# Patient Record
Sex: MALE | Race: WHITE | Employment: PART TIME | URBAN - METROPOLITAN AREA
[De-identification: names, ages, dates, MRNs, and addresses within clinical notes are randomized per-mention and may not be internally consistent; named-entity substitution may affect disease eponyms.]

---

## 2017-01-01 ENCOUNTER — APPOINTMENT (OUTPATIENT)
Dept: HEMATOLOGY/ONCOLOGY | Facility: HOSPITAL | Age: 67
End: 2017-01-01
Attending: INTERNAL MEDICINE
Payer: MEDICARE

## 2017-01-01 ENCOUNTER — TELEPHONE (OUTPATIENT)
Dept: OTOLARYNGOLOGY | Facility: CLINIC | Age: 67
End: 2017-01-01

## 2017-01-01 ENCOUNTER — OFFICE VISIT (OUTPATIENT)
Dept: RADIATION ONCOLOGY | Facility: HOSPITAL | Age: 67
End: 2017-01-01
Attending: RADIOLOGY
Payer: MEDICARE

## 2017-01-01 ENCOUNTER — PRIOR ORIGINAL RECORDS (OUTPATIENT)
Dept: OTHER | Age: 67
End: 2017-01-01

## 2017-01-01 ENCOUNTER — DIETICIAN VISIT (OUTPATIENT)
Dept: NUTRITION | Facility: HOSPITAL | Age: 67
End: 2017-01-01

## 2017-01-01 ENCOUNTER — TELEPHONE (OUTPATIENT)
Dept: INTERNAL MEDICINE CLINIC | Facility: CLINIC | Age: 67
End: 2017-01-01

## 2017-01-01 ENCOUNTER — OFFICE VISIT (OUTPATIENT)
Dept: GASTROENTEROLOGY | Facility: CLINIC | Age: 67
End: 2017-01-01

## 2017-01-01 ENCOUNTER — HOSPITAL ENCOUNTER (OUTPATIENT)
Dept: GENERAL RADIOLOGY | Age: 67
Discharge: HOME | End: 2017-01-01
Attending: RADIOLOGY
Payer: MEDICARE

## 2017-01-01 ENCOUNTER — OFFICE VISIT (OUTPATIENT)
Dept: HEMATOLOGY/ONCOLOGY | Facility: HOSPITAL | Age: 67
End: 2017-01-01
Attending: PHYSICIAN ASSISTANT
Payer: MEDICARE

## 2017-01-01 ENCOUNTER — APPOINTMENT (OUTPATIENT)
Dept: CT IMAGING | Facility: HOSPITAL | Age: 67
End: 2017-01-01
Payer: MEDICARE

## 2017-01-01 ENCOUNTER — TELEPHONE (OUTPATIENT)
Dept: GASTROENTEROLOGY | Facility: CLINIC | Age: 67
End: 2017-01-01

## 2017-01-01 ENCOUNTER — OFFICE VISIT (OUTPATIENT)
Dept: INTERNAL MEDICINE CLINIC | Facility: CLINIC | Age: 67
End: 2017-01-01

## 2017-01-01 ENCOUNTER — HOSPITAL ENCOUNTER (OUTPATIENT)
Facility: HOSPITAL | Age: 67
Setting detail: OBSERVATION
Discharge: HOME | End: 2017-01-01
Attending: INTERNAL MEDICINE | Admitting: INTERNAL MEDICINE
Payer: MEDICARE

## 2017-01-01 ENCOUNTER — HOSPITAL ENCOUNTER (OUTPATIENT)
Dept: ULTRASOUND IMAGING | Age: 67
Discharge: HOME | End: 2017-01-01
Attending: INTERNAL MEDICINE
Payer: MEDICARE

## 2017-01-01 ENCOUNTER — HOSPITAL ENCOUNTER (EMERGENCY)
Facility: HOSPITAL | Age: 67
Discharge: ANOTHER HOSPITAL | End: 2017-01-01
Attending: EMERGENCY MEDICINE
Payer: MEDICARE

## 2017-01-01 ENCOUNTER — TELEPHONE (OUTPATIENT)
Dept: HEMATOLOGY/ONCOLOGY | Facility: HOSPITAL | Age: 67
End: 2017-01-01

## 2017-01-01 ENCOUNTER — ANESTHESIA EVENT (OUTPATIENT)
Dept: ENDOSCOPY | Facility: HOSPITAL | Age: 67
End: 2017-01-01
Payer: MEDICARE

## 2017-01-01 ENCOUNTER — TELEPHONE (OUTPATIENT)
Dept: RADIATION ONCOLOGY | Facility: HOSPITAL | Age: 67
End: 2017-01-01

## 2017-01-01 ENCOUNTER — LAB REQUISITION (OUTPATIENT)
Dept: LAB | Facility: HOSPITAL | Age: 67
End: 2017-01-01
Payer: MEDICARE

## 2017-01-01 ENCOUNTER — SURGERY (OUTPATIENT)
Age: 67
End: 2017-01-01

## 2017-01-01 ENCOUNTER — DOCUMENTATION ONLY (OUTPATIENT)
Dept: RADIATION ONCOLOGY | Facility: HOSPITAL | Age: 67
End: 2017-01-01

## 2017-01-01 ENCOUNTER — HOSPITAL ENCOUNTER (OUTPATIENT)
Dept: NUCLEAR MEDICINE | Facility: HOSPITAL | Age: 67
Discharge: HOME | End: 2017-01-01
Attending: OTOLARYNGOLOGY
Payer: MEDICARE

## 2017-01-01 ENCOUNTER — LAB ENCOUNTER (OUTPATIENT)
Dept: LAB | Age: 67
End: 2017-01-01
Attending: INTERNAL MEDICINE
Payer: MEDICARE

## 2017-01-01 ENCOUNTER — OFFICE VISIT (OUTPATIENT)
Dept: OTOLARYNGOLOGY | Facility: CLINIC | Age: 67
End: 2017-01-01

## 2017-01-01 ENCOUNTER — ANESTHESIA (OUTPATIENT)
Dept: ENDOSCOPY | Facility: HOSPITAL | Age: 67
End: 2017-01-01
Payer: MEDICARE

## 2017-01-01 ENCOUNTER — APPOINTMENT (OUTPATIENT)
Dept: GENERAL RADIOLOGY | Facility: HOSPITAL | Age: 67
End: 2017-01-01
Attending: EMERGENCY MEDICINE
Payer: MEDICARE

## 2017-01-01 ENCOUNTER — HOSPITAL ENCOUNTER (OUTPATIENT)
Dept: GENERAL RADIOLOGY | Age: 67
Discharge: HOME | End: 2017-01-01
Attending: INTERNAL MEDICINE
Payer: MEDICARE

## 2017-01-01 VITALS
WEIGHT: 224 LBS | TEMPERATURE: 98 F | DIASTOLIC BLOOD PRESSURE: 78 MMHG | HEIGHT: 74 IN | SYSTOLIC BLOOD PRESSURE: 136 MMHG | BODY MASS INDEX: 28.75 KG/M2 | RESPIRATION RATE: 18 BRPM | HEART RATE: 110 BPM

## 2017-01-01 VITALS
HEART RATE: 97 BPM | RESPIRATION RATE: 16 BRPM | RESPIRATION RATE: 16 BRPM | DIASTOLIC BLOOD PRESSURE: 79 MMHG | DIASTOLIC BLOOD PRESSURE: 73 MMHG | TEMPERATURE: 98 F | SYSTOLIC BLOOD PRESSURE: 158 MMHG | HEART RATE: 87 BPM | TEMPERATURE: 98 F | SYSTOLIC BLOOD PRESSURE: 139 MMHG

## 2017-01-01 VITALS
SYSTOLIC BLOOD PRESSURE: 134 MMHG | RESPIRATION RATE: 16 BRPM | TEMPERATURE: 99 F | HEART RATE: 100 BPM | DIASTOLIC BLOOD PRESSURE: 68 MMHG | WEIGHT: 222 LBS | HEIGHT: 74 IN | BODY MASS INDEX: 28.49 KG/M2

## 2017-01-01 VITALS
BODY MASS INDEX: 30.85 KG/M2 | WEIGHT: 240.38 LBS | RESPIRATION RATE: 16 BRPM | HEART RATE: 78 BPM | SYSTOLIC BLOOD PRESSURE: 117 MMHG | DIASTOLIC BLOOD PRESSURE: 67 MMHG | HEIGHT: 74 IN

## 2017-01-01 VITALS
DIASTOLIC BLOOD PRESSURE: 69 MMHG | TEMPERATURE: 99 F | HEART RATE: 86 BPM | HEIGHT: 74 IN | OXYGEN SATURATION: 95 % | RESPIRATION RATE: 16 BRPM | WEIGHT: 220.19 LBS | SYSTOLIC BLOOD PRESSURE: 147 MMHG | BODY MASS INDEX: 28.26 KG/M2

## 2017-01-01 VITALS
SYSTOLIC BLOOD PRESSURE: 137 MMHG | HEIGHT: 74 IN | DIASTOLIC BLOOD PRESSURE: 69 MMHG | TEMPERATURE: 98 F | RESPIRATION RATE: 18 BRPM | BODY MASS INDEX: 28.23 KG/M2 | HEART RATE: 93 BPM | WEIGHT: 220 LBS

## 2017-01-01 VITALS
HEART RATE: 74 BPM | RESPIRATION RATE: 16 BRPM | SYSTOLIC BLOOD PRESSURE: 131 MMHG | TEMPERATURE: 99 F | DIASTOLIC BLOOD PRESSURE: 76 MMHG

## 2017-01-01 VITALS
DIASTOLIC BLOOD PRESSURE: 68 MMHG | WEIGHT: 242.19 LBS | HEIGHT: 74 IN | SYSTOLIC BLOOD PRESSURE: 147 MMHG | HEART RATE: 89 BPM | RESPIRATION RATE: 18 BRPM | BODY MASS INDEX: 31.08 KG/M2

## 2017-01-01 VITALS
SYSTOLIC BLOOD PRESSURE: 121 MMHG | HEART RATE: 83 BPM | RESPIRATION RATE: 16 BRPM | TEMPERATURE: 98 F | DIASTOLIC BLOOD PRESSURE: 70 MMHG

## 2017-01-01 VITALS
HEART RATE: 78 BPM | WEIGHT: 241 LBS | RESPIRATION RATE: 16 BRPM | BODY MASS INDEX: 31 KG/M2 | SYSTOLIC BLOOD PRESSURE: 138 MMHG | TEMPERATURE: 98 F | DIASTOLIC BLOOD PRESSURE: 71 MMHG

## 2017-01-01 VITALS
HEART RATE: 85 BPM | RESPIRATION RATE: 16 BRPM | SYSTOLIC BLOOD PRESSURE: 134 MMHG | HEIGHT: 74 IN | TEMPERATURE: 98 F | DIASTOLIC BLOOD PRESSURE: 81 MMHG | WEIGHT: 217 LBS | BODY MASS INDEX: 27.85 KG/M2

## 2017-01-01 VITALS
WEIGHT: 211 LBS | TEMPERATURE: 99 F | HEIGHT: 74 IN | HEART RATE: 100 BPM | SYSTOLIC BLOOD PRESSURE: 121 MMHG | RESPIRATION RATE: 18 BRPM | DIASTOLIC BLOOD PRESSURE: 69 MMHG | BODY MASS INDEX: 27.08 KG/M2

## 2017-01-01 VITALS
HEART RATE: 86 BPM | SYSTOLIC BLOOD PRESSURE: 131 MMHG | RESPIRATION RATE: 16 BRPM | TEMPERATURE: 99 F | DIASTOLIC BLOOD PRESSURE: 71 MMHG

## 2017-01-01 VITALS
TEMPERATURE: 98 F | HEIGHT: 74 IN | DIASTOLIC BLOOD PRESSURE: 76 MMHG | BODY MASS INDEX: 30.16 KG/M2 | WEIGHT: 235 LBS | SYSTOLIC BLOOD PRESSURE: 124 MMHG | RESPIRATION RATE: 16 BRPM | HEART RATE: 82 BPM

## 2017-01-01 VITALS
DIASTOLIC BLOOD PRESSURE: 73 MMHG | RESPIRATION RATE: 16 BRPM | WEIGHT: 237.63 LBS | TEMPERATURE: 98 F | TEMPERATURE: 99 F | BODY MASS INDEX: 30.5 KG/M2 | RESPIRATION RATE: 16 BRPM | SYSTOLIC BLOOD PRESSURE: 110 MMHG | HEART RATE: 92 BPM | SYSTOLIC BLOOD PRESSURE: 135 MMHG | DIASTOLIC BLOOD PRESSURE: 73 MMHG | HEART RATE: 88 BPM | HEIGHT: 74 IN

## 2017-01-01 VITALS
BODY MASS INDEX: 30.16 KG/M2 | HEIGHT: 74 IN | SYSTOLIC BLOOD PRESSURE: 99 MMHG | RESPIRATION RATE: 20 BRPM | WEIGHT: 235 LBS | DIASTOLIC BLOOD PRESSURE: 66 MMHG | HEART RATE: 80 BPM | TEMPERATURE: 99 F

## 2017-01-01 VITALS
WEIGHT: 211 LBS | BODY MASS INDEX: 27.08 KG/M2 | DIASTOLIC BLOOD PRESSURE: 69 MMHG | HEART RATE: 100 BPM | HEIGHT: 74 IN | RESPIRATION RATE: 18 BRPM | TEMPERATURE: 99 F | SYSTOLIC BLOOD PRESSURE: 121 MMHG

## 2017-01-01 VITALS
HEIGHT: 74 IN | RESPIRATION RATE: 16 BRPM | TEMPERATURE: 99 F | BODY MASS INDEX: 28.49 KG/M2 | DIASTOLIC BLOOD PRESSURE: 68 MMHG | SYSTOLIC BLOOD PRESSURE: 134 MMHG | WEIGHT: 222 LBS | HEART RATE: 100 BPM

## 2017-01-01 VITALS
TEMPERATURE: 98 F | BODY MASS INDEX: 30.8 KG/M2 | SYSTOLIC BLOOD PRESSURE: 155 MMHG | RESPIRATION RATE: 18 BRPM | WEIGHT: 240 LBS | HEIGHT: 74 IN | HEART RATE: 79 BPM | DIASTOLIC BLOOD PRESSURE: 74 MMHG

## 2017-01-01 VITALS
DIASTOLIC BLOOD PRESSURE: 68 MMHG | BODY MASS INDEX: 29 KG/M2 | BODY MASS INDEX: 27.87 KG/M2 | SYSTOLIC BLOOD PRESSURE: 118 MMHG | WEIGHT: 227.81 LBS | HEART RATE: 108 BPM | HEART RATE: 95 BPM | RESPIRATION RATE: 16 BRPM | OXYGEN SATURATION: 97 % | DIASTOLIC BLOOD PRESSURE: 77 MMHG | TEMPERATURE: 98 F | WEIGHT: 217.19 LBS | TEMPERATURE: 98 F | SYSTOLIC BLOOD PRESSURE: 124 MMHG | HEIGHT: 74 IN | RESPIRATION RATE: 16 BRPM

## 2017-01-01 VITALS
WEIGHT: 241 LBS | DIASTOLIC BLOOD PRESSURE: 88 MMHG | SYSTOLIC BLOOD PRESSURE: 140 MMHG | HEART RATE: 72 BPM | BODY MASS INDEX: 30.93 KG/M2 | RESPIRATION RATE: 20 BRPM | TEMPERATURE: 98 F | HEIGHT: 74 IN

## 2017-01-01 VITALS
DIASTOLIC BLOOD PRESSURE: 81 MMHG | SYSTOLIC BLOOD PRESSURE: 129 MMHG | WEIGHT: 214 LBS | TEMPERATURE: 99 F | HEART RATE: 92 BPM | RESPIRATION RATE: 16 BRPM | BODY MASS INDEX: 27 KG/M2

## 2017-01-01 VITALS
BODY MASS INDEX: 30.44 KG/M2 | RESPIRATION RATE: 16 BRPM | WEIGHT: 237.19 LBS | HEART RATE: 77 BPM | HEIGHT: 74 IN | DIASTOLIC BLOOD PRESSURE: 70 MMHG | SYSTOLIC BLOOD PRESSURE: 126 MMHG

## 2017-01-01 VITALS
RESPIRATION RATE: 16 BRPM | DIASTOLIC BLOOD PRESSURE: 71 MMHG | HEART RATE: 71 BPM | SYSTOLIC BLOOD PRESSURE: 144 MMHG | TEMPERATURE: 98 F

## 2017-01-01 VITALS
RESPIRATION RATE: 16 BRPM | HEART RATE: 92 BPM | TEMPERATURE: 98 F | DIASTOLIC BLOOD PRESSURE: 69 MMHG | SYSTOLIC BLOOD PRESSURE: 132 MMHG

## 2017-01-01 VITALS
SYSTOLIC BLOOD PRESSURE: 123 MMHG | HEART RATE: 84 BPM | RESPIRATION RATE: 16 BRPM | DIASTOLIC BLOOD PRESSURE: 59 MMHG | TEMPERATURE: 98 F

## 2017-01-01 VITALS
DIASTOLIC BLOOD PRESSURE: 70 MMHG | HEART RATE: 91 BPM | SYSTOLIC BLOOD PRESSURE: 106 MMHG | HEIGHT: 74 IN | WEIGHT: 227 LBS | BODY MASS INDEX: 29.13 KG/M2

## 2017-01-01 VITALS
TEMPERATURE: 99 F | RESPIRATION RATE: 16 BRPM | HEART RATE: 91 BPM | DIASTOLIC BLOOD PRESSURE: 78 MMHG | SYSTOLIC BLOOD PRESSURE: 147 MMHG

## 2017-01-01 VITALS — DIASTOLIC BLOOD PRESSURE: 77 MMHG | SYSTOLIC BLOOD PRESSURE: 126 MMHG | HEART RATE: 90 BPM

## 2017-01-01 VITALS
DIASTOLIC BLOOD PRESSURE: 78 MMHG | TEMPERATURE: 98 F | BODY MASS INDEX: 30.48 KG/M2 | SYSTOLIC BLOOD PRESSURE: 120 MMHG | HEIGHT: 74.5 IN | WEIGHT: 240 LBS

## 2017-01-01 VITALS
DIASTOLIC BLOOD PRESSURE: 64 MMHG | SYSTOLIC BLOOD PRESSURE: 121 MMHG | TEMPERATURE: 98 F | RESPIRATION RATE: 16 BRPM | HEART RATE: 80 BPM

## 2017-01-01 VITALS
TEMPERATURE: 99 F | RESPIRATION RATE: 16 BRPM | DIASTOLIC BLOOD PRESSURE: 70 MMHG | HEART RATE: 80 BPM | SYSTOLIC BLOOD PRESSURE: 136 MMHG

## 2017-01-01 VITALS
HEART RATE: 116 BPM | RESPIRATION RATE: 20 BRPM | TEMPERATURE: 99 F | WEIGHT: 198.44 LBS | OXYGEN SATURATION: 100 % | SYSTOLIC BLOOD PRESSURE: 116 MMHG | DIASTOLIC BLOOD PRESSURE: 66 MMHG

## 2017-01-01 VITALS — WEIGHT: 223 LBS | BODY MASS INDEX: 29 KG/M2

## 2017-01-01 DIAGNOSIS — C01 CANCER OF BASE OF TONGUE (HCC): Primary | ICD-10-CM

## 2017-01-01 DIAGNOSIS — D70.1 CHEMOTHERAPY INDUCED NEUTROPENIA (HCC): ICD-10-CM

## 2017-01-01 DIAGNOSIS — I25.10 CORONARY ARTERY DISEASE INVOLVING NATIVE HEART WITHOUT ANGINA PECTORIS, UNSPECIFIED VESSEL OR LESION TYPE: ICD-10-CM

## 2017-01-01 DIAGNOSIS — C10.9 OROPHARYNGEAL CANCER (HCC): ICD-10-CM

## 2017-01-01 DIAGNOSIS — Z09 CHEMOTHERAPY FOLLOW-UP EXAMINATION: ICD-10-CM

## 2017-01-01 DIAGNOSIS — R22.1 MASS OF RIGHT SIDE OF NECK: Primary | ICD-10-CM

## 2017-01-01 DIAGNOSIS — J34.89 LESION OF NOSE: ICD-10-CM

## 2017-01-01 DIAGNOSIS — E86.0 DEHYDRATION: Primary | ICD-10-CM

## 2017-01-01 DIAGNOSIS — C10.9 OROPHARYNGEAL CANCER (HCC): Primary | ICD-10-CM

## 2017-01-01 DIAGNOSIS — C01 MALIGNANT NEOPLASM OF BASE OF TONGUE (HCC): Primary | ICD-10-CM

## 2017-01-01 DIAGNOSIS — C76.0 HEAD AND NECK CANCER (HCC): ICD-10-CM

## 2017-01-01 DIAGNOSIS — D64.81 ANTINEOPLASTIC CHEMOTHERAPY INDUCED ANEMIA: ICD-10-CM

## 2017-01-01 DIAGNOSIS — M54.32 SCIATICA OF LEFT SIDE: ICD-10-CM

## 2017-01-01 DIAGNOSIS — C14.0 MALIGNANT NEOPLASM OF PHARYNX, UNSPECIFIED (HCC): Primary | ICD-10-CM

## 2017-01-01 DIAGNOSIS — I60.9 SUBARACHNOID HEMORRHAGE (HCC): Primary | ICD-10-CM

## 2017-01-01 DIAGNOSIS — E04.1 THYROID NODULE: ICD-10-CM

## 2017-01-01 DIAGNOSIS — R22.1 MASS OF RIGHT SIDE OF NECK: ICD-10-CM

## 2017-01-01 DIAGNOSIS — T45.1X5A ANTINEOPLASTIC CHEMOTHERAPY INDUCED ANEMIA: ICD-10-CM

## 2017-01-01 DIAGNOSIS — T45.1X5A CHEMOTHERAPY INDUCED NAUSEA AND VOMITING: ICD-10-CM

## 2017-01-01 DIAGNOSIS — S02.19XA CLOSED FRACTURE OF TEMPORAL BONE, INITIAL ENCOUNTER (HCC): ICD-10-CM

## 2017-01-01 DIAGNOSIS — R13.10 DYSPHAGIA, UNSPECIFIED TYPE: Primary | ICD-10-CM

## 2017-01-01 DIAGNOSIS — C01 MALIGNANT NEOPLASM OF BASE OF TONGUE (HCC): ICD-10-CM

## 2017-01-01 DIAGNOSIS — C01: Primary | ICD-10-CM

## 2017-01-01 DIAGNOSIS — C01 CANCER OF BASE OF TONGUE (HCC): ICD-10-CM

## 2017-01-01 DIAGNOSIS — C02.9 TONGUE CARCINOMA (HCC): Primary | ICD-10-CM

## 2017-01-01 DIAGNOSIS — R73.9 ELEVATED BLOOD SUGAR: Primary | ICD-10-CM

## 2017-01-01 DIAGNOSIS — T45.1X5A CHEMOTHERAPY INDUCED NEUTROPENIA (HCC): ICD-10-CM

## 2017-01-01 DIAGNOSIS — Z12.5 SCREENING PSA (PROSTATE SPECIFIC ANTIGEN): ICD-10-CM

## 2017-01-01 DIAGNOSIS — Z00.00 MEDICARE ANNUAL WELLNESS VISIT, SUBSEQUENT: Primary | ICD-10-CM

## 2017-01-01 DIAGNOSIS — E78.00 PURE HYPERCHOLESTEROLEMIA: ICD-10-CM

## 2017-01-01 DIAGNOSIS — C76.0 HEAD AND NECK CANCER (HCC): Primary | ICD-10-CM

## 2017-01-01 DIAGNOSIS — I10 HTN (HYPERTENSION), BENIGN: ICD-10-CM

## 2017-01-01 DIAGNOSIS — J30.9 ALLERGIC RHINITIS, UNSPECIFIED ALLERGIC RHINITIS TRIGGER, UNSPECIFIED RHINITIS SEASONALITY: ICD-10-CM

## 2017-01-01 DIAGNOSIS — R73.9 ELEVATED BLOOD SUGAR: ICD-10-CM

## 2017-01-01 DIAGNOSIS — L98.9 EXTERNAL NASAL LESION: ICD-10-CM

## 2017-01-01 DIAGNOSIS — R11.2 CHEMOTHERAPY INDUCED NAUSEA AND VOMITING: ICD-10-CM

## 2017-01-01 DIAGNOSIS — Z01.89 ENCOUNTER FOR OTHER SPECIFIED SPECIAL EXAMINATIONS: ICD-10-CM

## 2017-01-01 DIAGNOSIS — S27.0XXA TRAUMATIC PNEUMOTHORAX, INITIAL ENCOUNTER: ICD-10-CM

## 2017-01-01 DIAGNOSIS — R13.10 DYSPHAGIA: Primary | ICD-10-CM

## 2017-01-01 DIAGNOSIS — S22.5XXA CLOSED FRACTURE OF MULTIPLE RIBS WITH FLAIL CHEST, INITIAL ENCOUNTER: ICD-10-CM

## 2017-01-01 LAB
ALBUMIN SERPL BCP-MCNC: 3.5 G/DL (ref 3.5–4.8)
ALBUMIN SERPL BCP-MCNC: 3.6 G/DL (ref 3.5–4.8)
ALBUMIN/GLOB SERPL: 0.9 {RATIO} (ref 1–2)
ALBUMIN/GLOB SERPL: 1.1 {RATIO} (ref 1–2)
ALBUMIN: 4.4 G/DL
ALKALINE PHOSPHATATE(ALK PHOS): 58 IU/L
ALP SERPL-CCNC: 101 U/L (ref 32–100)
ALP SERPL-CCNC: 77 U/L (ref 32–100)
ALT (SGPT): 37 U/L
ALT SERPL-CCNC: 27 U/L (ref 17–63)
ALT SERPL-CCNC: 38 U/L (ref 17–63)
ANION GAP SERPL CALC-SCNC: 10 MMOL/L (ref 0–18)
ANION GAP SERPL CALC-SCNC: 11 MMOL/L (ref 0–18)
ANION GAP SERPL CALC-SCNC: 12 MMOL/L (ref 0–18)
ANION GAP SERPL CALC-SCNC: 14 MMOL/L (ref 0–18)
ANTIBODY SCREEN: NEGATIVE
APTT PPP: 32.1 SECONDS (ref 23.2–35.3)
AST (SGOT): 29 U/L
AST SERPL-CCNC: 25 U/L (ref 15–41)
AST SERPL-CCNC: 28 U/L (ref 15–41)
BACTERIA UR QL AUTO: NEGATIVE /HPF
BASOPHILS # BLD: 0 K/UL (ref 0–0.2)
BASOPHILS NFR BLD: 0 %
BASOPHILS NFR BLD: 1 %
BILIRUB SERPL-MCNC: 0.5 MG/DL (ref 0.3–1.2)
BILIRUB SERPL-MCNC: 0.6 MG/DL (ref 0.3–1.2)
BILIRUB UR QL: NEGATIVE
BILIRUBIN TOTAL: 1.1 MG/DL
BUN SERPL-MCNC: 11 MG/DL (ref 8–20)
BUN SERPL-MCNC: 12 MG/DL (ref 8–20)
BUN SERPL-MCNC: 13 MG/DL (ref 8–20)
BUN SERPL-MCNC: 15 MG/DL (ref 8–20)
BUN/CREAT SERPL: 14.4 (ref 10–20)
BUN/CREAT SERPL: 15.2 (ref 10–20)
BUN/CREAT SERPL: 15.5 (ref 10–20)
BUN/CREAT SERPL: 17.2 (ref 10–20)
BUN: 12 MG/DL
CALCIUM SERPL-MCNC: 8.8 MG/DL (ref 8.5–10.5)
CALCIUM SERPL-MCNC: 8.9 MG/DL (ref 8.5–10.5)
CALCIUM SERPL-MCNC: 9.5 MG/DL (ref 8.5–10.5)
CALCIUM SERPL-MCNC: 9.6 MG/DL (ref 8.5–10.5)
CALCIUM: 10.3 MG/DL
CHLORIDE SERPL-SCNC: 100 MMOL/L (ref 95–110)
CHLORIDE SERPL-SCNC: 94 MMOL/L (ref 95–110)
CHLORIDE SERPL-SCNC: 94 MMOL/L (ref 95–110)
CHLORIDE SERPL-SCNC: 99 MMOL/L (ref 95–110)
CHLORIDE: 99 MEQ/L
CHOLESTEROL, TOTAL: 155 MG/DL
CO2 SERPL-SCNC: 24 MMOL/L (ref 22–32)
CO2 SERPL-SCNC: 26 MMOL/L (ref 22–32)
COLOR UR: YELLOW
CREAT SERPL-MCNC: 0.64 MG/DL (ref 0.5–1.5)
CREAT SERPL-MCNC: 0.79 MG/DL (ref 0.5–1.5)
CREAT SERPL-MCNC: 0.9 MG/DL (ref 0.5–1.5)
CREAT SERPL-MCNC: 0.97 MG/DL (ref 0.5–1.5)
CREATININE, SERUM: 0.81 MG/DL
EOSINOPHIL # BLD: 0 K/UL (ref 0–0.7)
EOSINOPHIL # BLD: 0.1 K/UL (ref 0–0.7)
EOSINOPHIL NFR BLD: 0 %
EOSINOPHIL NFR BLD: 0 %
EOSINOPHIL NFR BLD: 1 %
ERYTHROCYTE [DISTWIDTH] IN BLOOD BY AUTOMATED COUNT: 13.3 % (ref 11–15)
ERYTHROCYTE [DISTWIDTH] IN BLOOD BY AUTOMATED COUNT: 13.3 % (ref 11–15)
ERYTHROCYTE [DISTWIDTH] IN BLOOD BY AUTOMATED COUNT: 14.4 % (ref 11–15)
ERYTHROCYTE [DISTWIDTH] IN BLOOD BY AUTOMATED COUNT: 16.4 % (ref 11–15)
ERYTHROCYTE [DISTWIDTH] IN BLOOD BY AUTOMATED COUNT: 17.5 % (ref 11–15)
ETHANOL SERPL-MCNC: 2 MG/DL
GLOBULIN PLAS-MCNC: 3.4 G/DL (ref 2.5–3.7)
GLOBULIN PLAS-MCNC: 4 G/DL (ref 2.5–3.7)
GLOBULIN: 3.6 G/DL
GLUCOSE SERPL-MCNC: 135 MG/DL (ref 70–99)
GLUCOSE SERPL-MCNC: 180 MG/DL (ref 70–99)
GLUCOSE SERPL-MCNC: 241 MG/DL (ref 70–99)
GLUCOSE SERPL-MCNC: 95 MG/DL (ref 70–99)
GLUCOSE UR-MCNC: NEGATIVE MG/DL
GLUCOSE: 99 MG/DL
GLUCOSE: 99 MG/DL
HCT VFR BLD AUTO: 30.9 % (ref 41–52)
HCT VFR BLD AUTO: 32.2 % (ref 41–52)
HCT VFR BLD AUTO: 32.5 % (ref 41–52)
HCT VFR BLD AUTO: 32.9 % (ref 41–52)
HCT VFR BLD AUTO: 35.3 % (ref 41–52)
HDL CHOLESTEROL: 39 MG/DL
HEMATOCRIT: 43.9 %
HEMOGLOBIN: 14.8 G/DL
HGB BLD-MCNC: 10.8 G/DL (ref 13.5–17.5)
HGB BLD-MCNC: 11 G/DL (ref 13.5–17.5)
HGB BLD-MCNC: 11.4 G/DL (ref 13.5–17.5)
HGB BLD-MCNC: 11.5 G/DL (ref 13.5–17.5)
HGB BLD-MCNC: 12.4 G/DL (ref 13.5–17.5)
INR BLD: 1.2 (ref 0.9–1.2)
KETONES UR-MCNC: NEGATIVE MG/DL
LDL CHOLESTEROL: 92 MG/DL
LEUKOCYTE ESTERASE UR QL STRIP.AUTO: NEGATIVE
LYMPHOCYTES # BLD: 0.2 K/UL (ref 1–4)
LYMPHOCYTES # BLD: 0.3 K/UL (ref 1–4)
LYMPHOCYTES # BLD: 0.4 K/UL (ref 1–4)
LYMPHOCYTES # BLD: 0.4 K/UL (ref 1–4)
LYMPHOCYTES # BLD: 1.7 K/UL (ref 1–4)
LYMPHOCYTES NFR BLD: 13 %
LYMPHOCYTES NFR BLD: 22 %
LYMPHOCYTES NFR BLD: 5 %
LYMPHOCYTES NFR BLD: 7 %
LYMPHOCYTES NFR BLD: 8 %
MAGNESIUM SERPL-MCNC: 1.7 MG/DL (ref 1.8–2.5)
MCH RBC QN AUTO: 29.3 PG (ref 27–32)
MCH RBC QN AUTO: 29.3 PG (ref 27–32)
MCH RBC QN AUTO: 29.5 PG (ref 27–32)
MCH RBC QN AUTO: 30 PG (ref 27–32)
MCH RBC QN AUTO: 30.3 PG (ref 27–32)
MCHC RBC AUTO-ENTMCNC: 34.2 G/DL (ref 32–37)
MCHC RBC AUTO-ENTMCNC: 34.9 G/DL (ref 32–37)
MCHC RBC AUTO-ENTMCNC: 35.1 G/DL (ref 32–37)
MCV RBC AUTO: 83.5 FL (ref 80–100)
MCV RBC AUTO: 83.6 FL (ref 80–100)
MCV RBC AUTO: 84.1 FL (ref 80–100)
MCV RBC AUTO: 85.8 FL (ref 80–100)
MCV RBC AUTO: 88.6 FL (ref 80–100)
MONOCYTES # BLD: 0.3 K/UL (ref 0–1)
MONOCYTES # BLD: 0.4 K/UL (ref 0–1)
MONOCYTES # BLD: 0.5 K/UL (ref 0–1)
MONOCYTES # BLD: 0.5 K/UL (ref 0–1)
MONOCYTES # BLD: 0.7 K/UL (ref 0–1)
MONOCYTES NFR BLD: 16 %
MONOCYTES NFR BLD: 17 %
MONOCYTES NFR BLD: 7 %
MONOCYTES NFR BLD: 8 %
MONOCYTES NFR BLD: 9 %
NEUTROPHILS # BLD AUTO: 2 K/UL (ref 1.8–7.7)
NEUTROPHILS # BLD AUTO: 2.5 K/UL (ref 1.8–7.7)
NEUTROPHILS # BLD AUTO: 4.2 K/UL (ref 1.8–7.7)
NEUTROPHILS # BLD AUTO: 4.3 K/UL (ref 1.8–7.7)
NEUTROPHILS # BLD AUTO: 5.5 K/UL (ref 1.8–7.7)
NEUTROPHILS NFR BLD: 68 %
NEUTROPHILS NFR BLD: 69 %
NEUTROPHILS NFR BLD: 75 %
NEUTROPHILS NFR BLD: 83 %
NEUTROPHILS NFR BLD: 87 %
NITRITE UR QL STRIP.AUTO: NEGATIVE
OSMOLALITY UR CALC.SUM OF ELEC: 280 MOSM/KG (ref 275–295)
OSMOLALITY UR CALC.SUM OF ELEC: 281 MOSM/KG (ref 275–295)
OSMOLALITY UR CALC.SUM OF ELEC: 282 MOSM/KG (ref 275–295)
OSMOLALITY UR CALC.SUM OF ELEC: 283 MOSM/KG (ref 275–295)
PH UR: 6 [PH] (ref 5–8)
PHOSPHATE SERPL-MCNC: 3.7 MG/DL (ref 2.4–4.7)
PLATELET # BLD AUTO: 159 K/UL (ref 140–400)
PLATELET # BLD AUTO: 174 K/UL (ref 140–400)
PLATELET # BLD AUTO: 336 K/UL (ref 140–400)
PLATELET # BLD AUTO: 358 K/UL (ref 140–400)
PLATELET # BLD AUTO: 395 K/UL (ref 140–400)
PLATELETS: 349 K/UL
PMV BLD AUTO: 6.9 FL (ref 7.4–10.3)
PMV BLD AUTO: 7 FL (ref 7.4–10.3)
PMV BLD AUTO: 7.5 FL (ref 7.4–10.3)
PMV BLD AUTO: 7.5 FL (ref 7.4–10.3)
PMV BLD AUTO: 8 FL (ref 7.4–10.3)
POTASSIUM SERPL-SCNC: 3.5 MMOL/L (ref 3.3–5.1)
POTASSIUM SERPL-SCNC: 3.6 MMOL/L (ref 3.3–5.1)
POTASSIUM SERPL-SCNC: 4 MMOL/L (ref 3.3–5.1)
POTASSIUM SERPL-SCNC: 4.3 MMOL/L (ref 3.3–5.1)
POTASSIUM, SERUM: 4.3 MEQ/L
PROT SERPL-MCNC: 7 G/DL (ref 5.9–8.4)
PROT SERPL-MCNC: 7.5 G/DL (ref 5.9–8.4)
PROT UR-MCNC: 100 MG/DL
PROTEIN, TOTAL: 8 G/DL
PROTHROMBIN TIME: 14.4 SECONDS (ref 11.8–14.5)
RBC # BLD AUTO: 3.61 M/UL (ref 4.5–5.9)
RBC # BLD AUTO: 3.63 M/UL (ref 4.5–5.9)
RBC # BLD AUTO: 3.86 M/UL (ref 4.5–5.9)
RBC # BLD AUTO: 3.94 M/UL (ref 4.5–5.9)
RBC # BLD AUTO: 4.22 M/UL (ref 4.5–5.9)
RBC #/AREA URNS AUTO: 120 /HPF
RED BLOOD COUNT: 5.13 X 10-6/U
RH BLOOD TYPE: POSITIVE
SGOT (AST): 29 IU/L
SGPT (ALT): 37 IU/L
SODIUM SERPL-SCNC: 131 MMOL/L (ref 136–144)
SODIUM SERPL-SCNC: 134 MMOL/L (ref 136–144)
SODIUM SERPL-SCNC: 135 MMOL/L (ref 136–144)
SODIUM SERPL-SCNC: 136 MMOL/L (ref 136–144)
SODIUM: 143 MEQ/L
SP GR UR STRIP: 1.02 (ref 1–1.03)
THYROID STIMULATING HORMONE: 1.12 MLU/L
TRIGLYCERIDES: 120 MG/DL
UROBILINOGEN UR STRIP-ACNC: 4
VIT C UR-MCNC: 40 MG/DL
WBC # BLD AUTO: 2.9 K/UL (ref 4–11)
WBC # BLD AUTO: 3.3 K/UL (ref 4–11)
WBC # BLD AUTO: 5 K/UL (ref 4–11)
WBC # BLD AUTO: 5.1 K/UL (ref 4–11)
WBC # BLD AUTO: 8 K/UL (ref 4–11)
WBC #/AREA URNS AUTO: 87 /HPF
WHITE BLOOD COUNT: 8.1 X 10-3/U

## 2017-01-01 PROCEDURE — 85025 COMPLETE CBC W/AUTO DIFF WBC: CPT

## 2017-01-01 PROCEDURE — 96360 HYDRATION IV INFUSION INIT: CPT

## 2017-01-01 PROCEDURE — 77386 HC IMRT COMPLEX: CPT | Performed by: RADIOLOGY

## 2017-01-01 PROCEDURE — 99215 OFFICE O/P EST HI 40 MIN: CPT | Performed by: INTERNAL MEDICINE

## 2017-01-01 PROCEDURE — 77336 RADIATION PHYSICS CONSULT: CPT | Performed by: RADIOLOGY

## 2017-01-01 PROCEDURE — G0463 HOSPITAL OUTPT CLINIC VISIT: HCPCS | Performed by: INTERNAL MEDICINE

## 2017-01-01 PROCEDURE — 96413 CHEMO IV INFUSION 1 HR: CPT

## 2017-01-01 PROCEDURE — 88305 TISSUE EXAM BY PATHOLOGIST: CPT | Performed by: OTOLARYNGOLOGY

## 2017-01-01 PROCEDURE — 99219 INITIAL OBSERVATION CARE,LEVL II: CPT | Performed by: HOSPITALIST

## 2017-01-01 PROCEDURE — 74177 CT ABD & PELVIS W/CONTRAST: CPT

## 2017-01-01 PROCEDURE — 99225 SUBSEQUENT OBSERVATION CARE: CPT | Performed by: INTERNAL MEDICINE

## 2017-01-01 PROCEDURE — 99212 OFFICE O/P EST SF 10 MIN: CPT

## 2017-01-01 PROCEDURE — 88173 CYTOPATH EVAL FNA REPORT: CPT | Performed by: OTOLARYNGOLOGY

## 2017-01-01 PROCEDURE — 96365 THER/PROPH/DIAG IV INF INIT: CPT

## 2017-01-01 PROCEDURE — 80053 COMPREHEN METABOLIC PANEL: CPT

## 2017-01-01 PROCEDURE — 85730 THROMBOPLASTIN TIME PARTIAL: CPT | Performed by: EMERGENCY MEDICINE

## 2017-01-01 PROCEDURE — 93005 ELECTROCARDIOGRAM TRACING: CPT

## 2017-01-01 PROCEDURE — 77338 DESIGN MLC DEVICE FOR IMRT: CPT | Performed by: RADIOLOGY

## 2017-01-01 PROCEDURE — 31500 INSERT EMERGENCY AIRWAY: CPT

## 2017-01-01 PROCEDURE — 43246 EGD PLACE GASTROSTOMY TUBE: CPT | Performed by: INTERNAL MEDICINE

## 2017-01-01 PROCEDURE — 32551 INSERTION OF CHEST TUBE: CPT

## 2017-01-01 PROCEDURE — 87086 URINE CULTURE/COLONY COUNT: CPT | Performed by: EMERGENCY MEDICINE

## 2017-01-01 PROCEDURE — 77301 RADIOTHERAPY DOSE PLAN IMRT: CPT | Performed by: RADIOLOGY

## 2017-01-01 PROCEDURE — 88342 IMHCHEM/IMCYTCHM 1ST ANTB: CPT | Performed by: OTOLARYNGOLOGY

## 2017-01-01 PROCEDURE — 96376 TX/PRO/DX INJ SAME DRUG ADON: CPT

## 2017-01-01 PROCEDURE — 80061 LIPID PANEL: CPT

## 2017-01-01 PROCEDURE — 73060 X-RAY EXAM OF HUMERUS: CPT | Performed by: RADIOLOGY

## 2017-01-01 PROCEDURE — 70450 CT HEAD/BRAIN W/O DYE: CPT | Performed by: EMERGENCY MEDICINE

## 2017-01-01 PROCEDURE — 96375 TX/PRO/DX INJ NEW DRUG ADDON: CPT

## 2017-01-01 PROCEDURE — 87880 STREP A ASSAY W/OPTIC: CPT | Performed by: INTERNAL MEDICINE

## 2017-01-01 PROCEDURE — 10021 FNA BX W/O IMG GDN 1ST LES: CPT | Performed by: OTOLARYNGOLOGY

## 2017-01-01 PROCEDURE — 96361 HYDRATE IV INFUSION ADD-ON: CPT

## 2017-01-01 PROCEDURE — 72100 X-RAY EXAM L-S SPINE 2/3 VWS: CPT

## 2017-01-01 PROCEDURE — 84443 ASSAY THYROID STIM HORMONE: CPT

## 2017-01-01 PROCEDURE — 85610 PROTHROMBIN TIME: CPT | Performed by: EMERGENCY MEDICINE

## 2017-01-01 PROCEDURE — 72125 CT NECK SPINE W/O DYE: CPT

## 2017-01-01 PROCEDURE — 88341 IMHCHEM/IMCYTCHM EA ADD ANTB: CPT | Performed by: OTOLARYNGOLOGY

## 2017-01-01 PROCEDURE — 96366 THER/PROPH/DIAG IV INF ADDON: CPT

## 2017-01-01 PROCEDURE — G0463 HOSPITAL OUTPT CLINIC VISIT: HCPCS | Performed by: PHYSICIAN ASSISTANT

## 2017-01-01 PROCEDURE — 86901 BLOOD TYPING SEROLOGIC RH(D): CPT | Performed by: EMERGENCY MEDICINE

## 2017-01-01 PROCEDURE — 77334 RADIATION TREATMENT AID(S): CPT | Performed by: RADIOLOGY

## 2017-01-01 PROCEDURE — 83036 HEMOGLOBIN GLYCOSYLATED A1C: CPT

## 2017-01-01 PROCEDURE — G0463 HOSPITAL OUTPT CLINIC VISIT: HCPCS | Performed by: OTOLARYNGOLOGY

## 2017-01-01 PROCEDURE — 99214 OFFICE O/P EST MOD 30 MIN: CPT | Performed by: NURSE PRACTITIONER

## 2017-01-01 PROCEDURE — 99291 CRITICAL CARE FIRST HOUR: CPT

## 2017-01-01 PROCEDURE — 77470 SPECIAL RADIATION TREATMENT: CPT | Performed by: RADIOLOGY

## 2017-01-01 PROCEDURE — 96367 TX/PROPH/DG ADDL SEQ IV INF: CPT

## 2017-01-01 PROCEDURE — 81015 MICROSCOPIC EXAM OF URINE: CPT

## 2017-01-01 PROCEDURE — 99213 OFFICE O/P EST LOW 20 MIN: CPT | Performed by: OTOLARYNGOLOGY

## 2017-01-01 PROCEDURE — 86900 BLOOD TYPING SEROLOGIC ABO: CPT | Performed by: EMERGENCY MEDICINE

## 2017-01-01 PROCEDURE — 99214 OFFICE O/P EST MOD 30 MIN: CPT | Performed by: INTERNAL MEDICINE

## 2017-01-01 PROCEDURE — 71260 CT THORAX DX C+: CPT

## 2017-01-01 PROCEDURE — 80048 BASIC METABOLIC PNL TOTAL CA: CPT | Performed by: EMERGENCY MEDICINE

## 2017-01-01 PROCEDURE — 80320 DRUG SCREEN QUANTALCOHOLS: CPT | Performed by: EMERGENCY MEDICINE

## 2017-01-01 PROCEDURE — 96160 PT-FOCUSED HLTH RISK ASSMT: CPT | Performed by: INTERNAL MEDICINE

## 2017-01-01 PROCEDURE — 36415 COLL VENOUS BLD VENIPUNCTURE: CPT

## 2017-01-01 PROCEDURE — 99217 OBSERVATION CARE DISCHARGE: CPT | Performed by: HOSPITALIST

## 2017-01-01 PROCEDURE — 0DH63UZ INSERTION OF FEEDING DEVICE INTO STOMACH, PERCUTANEOUS APPROACH: ICD-10-PCS | Performed by: INTERNAL MEDICINE

## 2017-01-01 PROCEDURE — 81001 URINALYSIS AUTO W/SCOPE: CPT | Performed by: EMERGENCY MEDICINE

## 2017-01-01 PROCEDURE — G0463 HOSPITAL OUTPT CLINIC VISIT: HCPCS | Performed by: NURSE PRACTITIONER

## 2017-01-01 PROCEDURE — 77399 UNLISTED PX MED RADJ PHYSICS: CPT | Performed by: RADIOLOGY

## 2017-01-01 PROCEDURE — 94002 VENT MGMT INPAT INIT DAY: CPT

## 2017-01-01 PROCEDURE — 76536 US EXAM OF HEAD AND NECK: CPT

## 2017-01-01 PROCEDURE — 71010 XR CHEST AP PORTABLE  (CPT=71010): CPT | Performed by: EMERGENCY MEDICINE

## 2017-01-01 PROCEDURE — 85025 COMPLETE CBC W/AUTO DIFF WBC: CPT | Performed by: EMERGENCY MEDICINE

## 2017-01-01 PROCEDURE — 93010 ELECTROCARDIOGRAM REPORT: CPT | Performed by: EMERGENCY MEDICINE

## 2017-01-01 PROCEDURE — 99215 OFFICE O/P EST HI 40 MIN: CPT | Performed by: PHYSICIAN ASSISTANT

## 2017-01-01 PROCEDURE — 99205 OFFICE O/P NEW HI 60 MIN: CPT | Performed by: INTERNAL MEDICINE

## 2017-01-01 PROCEDURE — 77300 RADIATION THERAPY DOSE PLAN: CPT | Performed by: RADIOLOGY

## 2017-01-01 PROCEDURE — 86850 RBC ANTIBODY SCREEN: CPT | Performed by: EMERGENCY MEDICINE

## 2017-01-01 PROCEDURE — 82962 GLUCOSE BLOOD TEST: CPT

## 2017-01-01 PROCEDURE — 78815 PET IMAGE W/CT SKULL-THIGH: CPT

## 2017-01-01 RX ORDER — SODIUM CHLORIDE 9 MG/ML
INJECTION, SOLUTION INTRAVENOUS
Status: COMPLETED
Start: 2017-01-01 | End: 2017-01-01

## 2017-01-01 RX ORDER — ONDANSETRON 2 MG/ML
8 INJECTION INTRAMUSCULAR; INTRAVENOUS EVERY 6 HOURS PRN
Status: CANCELLED | OUTPATIENT
Start: 2017-01-01

## 2017-01-01 RX ORDER — ACETAMINOPHEN 325 MG/1
650 TABLET ORAL EVERY 4 HOURS PRN
Status: DISCONTINUED | OUTPATIENT
Start: 2017-01-01 | End: 2017-01-01

## 2017-01-01 RX ORDER — SUCCINYLCHOLINE CHLORIDE 20 MG/ML
INJECTION INTRAMUSCULAR; INTRAVENOUS
Status: COMPLETED | OUTPATIENT
Start: 2017-01-01 | End: 2017-01-01

## 2017-01-01 RX ORDER — 0.9 % SODIUM CHLORIDE 0.9 %
VIAL (ML) INJECTION
Status: DISCONTINUED
Start: 2017-01-01 | End: 2017-01-01

## 2017-01-01 RX ORDER — DEXAMETHASONE 4 MG/1
8 TABLET ORAL 2 TIMES DAILY
Qty: 12 TABLET | Refills: 1 | Status: SHIPPED | OUTPATIENT
Start: 2017-01-01 | End: 2017-01-01

## 2017-01-01 RX ORDER — ONDANSETRON 4 MG/1
8 TABLET, FILM COATED ORAL EVERY 8 HOURS PRN
Status: DISCONTINUED | OUTPATIENT
Start: 2017-01-01 | End: 2017-01-01

## 2017-01-01 RX ORDER — HYDROCODONE BITARTRATE AND ACETAMINOPHEN 5; 325 MG/1; MG/1
1 TABLET ORAL EVERY 6 HOURS PRN
Qty: 30 TABLET | Refills: 0 | Status: SHIPPED | OUTPATIENT
Start: 2017-01-01 | End: 2017-01-01

## 2017-01-01 RX ORDER — MAGNESIUM SULFATE HEPTAHYDRATE 40 MG/ML
2 INJECTION, SOLUTION INTRAVENOUS ONCE
Status: DISCONTINUED | OUTPATIENT
Start: 2017-01-01 | End: 2017-01-01

## 2017-01-01 RX ORDER — HYDROCODONE BITARTRATE AND ACETAMINOPHEN 5; 325 MG/1; MG/1
TABLET ORAL
Status: ON HOLD | COMMUNITY
Start: 2017-01-01 | End: 2017-01-01

## 2017-01-01 RX ORDER — ONDANSETRON 2 MG/ML
4 INJECTION INTRAMUSCULAR; INTRAVENOUS EVERY 6 HOURS PRN
Status: DISCONTINUED | OUTPATIENT
Start: 2017-01-01 | End: 2017-01-01

## 2017-01-01 RX ORDER — HYDROCODONE BITARTRATE AND ACETAMINOPHEN 5; 325 MG/1; MG/1
1 TABLET ORAL EVERY 4 HOURS PRN
COMMUNITY

## 2017-01-01 RX ORDER — ARIPIPRAZOLE 15 MG/1
40 TABLET ORAL EVERY 4 HOURS
Status: DISCONTINUED | OUTPATIENT
Start: 2017-01-01 | End: 2017-01-01

## 2017-01-01 RX ORDER — MENTHOL 7.6 MG/1
LOZENGE ORAL
Status: ON HOLD | COMMUNITY
Start: 2017-01-01 | End: 2017-01-01

## 2017-01-01 RX ORDER — DEXTROSE MONOHYDRATE 100 MG/ML
INJECTION, SOLUTION INTRAVENOUS CONTINUOUS PRN
Status: DISCONTINUED | OUTPATIENT
Start: 2017-01-01 | End: 2017-01-01

## 2017-01-01 RX ORDER — SODIUM CHLORIDE 9 MG/ML
INJECTION, SOLUTION INTRAVENOUS
Status: COMPLETED | OUTPATIENT
Start: 2017-01-01 | End: 2017-01-01

## 2017-01-01 RX ORDER — LISINOPRIL 20 MG/1
20 TABLET ORAL DAILY
Status: DISCONTINUED | OUTPATIENT
Start: 2017-01-01 | End: 2017-01-01

## 2017-01-01 RX ORDER — SODIUM CHLORIDE 9 MG/ML
INJECTION, SOLUTION INTRAVENOUS
Status: DISCONTINUED
Start: 2017-01-01 | End: 2017-01-01

## 2017-01-01 RX ORDER — SODIUM CHLORIDE 9 MG/ML
1000 INJECTION, SOLUTION INTRAVENOUS ONCE
Status: COMPLETED | OUTPATIENT
Start: 2017-01-01 | End: 2017-01-01

## 2017-01-01 RX ORDER — SODIUM CHLORIDE, SODIUM LACTATE, POTASSIUM CHLORIDE, CALCIUM CHLORIDE 600; 310; 30; 20 MG/100ML; MG/100ML; MG/100ML; MG/100ML
INJECTION, SOLUTION INTRAVENOUS CONTINUOUS
Status: DISCONTINUED | OUTPATIENT
Start: 2017-01-01 | End: 2017-01-01

## 2017-01-01 RX ORDER — PHENOL 1.4 %
600 AEROSOL, SPRAY (ML) MUCOUS MEMBRANE DAILY
Status: ON HOLD | COMMUNITY
Start: 2017-01-01 | End: 2017-01-01

## 2017-01-01 RX ORDER — SODIUM CHLORIDE 9 MG/ML
1000 INJECTION, SOLUTION INTRAVENOUS ONCE
Status: CANCELLED | OUTPATIENT
Start: 2017-01-01

## 2017-01-01 RX ORDER — NALOXONE HYDROCHLORIDE 0.4 MG/ML
80 INJECTION, SOLUTION INTRAMUSCULAR; INTRAVENOUS; SUBCUTANEOUS AS NEEDED
Status: DISCONTINUED | OUTPATIENT
Start: 2017-01-01 | End: 2017-01-01 | Stop reason: HOSPADM

## 2017-01-01 RX ORDER — HYDROCODONE BITARTRATE AND ACETAMINOPHEN 5; 325 MG/1; MG/1
2 TABLET ORAL EVERY 4 HOURS PRN
Status: DISCONTINUED | OUTPATIENT
Start: 2017-01-01 | End: 2017-01-01

## 2017-01-01 RX ORDER — HYDROCHLOROTHIAZIDE 12.5 MG/1
12.5 CAPSULE, GELATIN COATED ORAL DAILY
Status: DISCONTINUED | OUTPATIENT
Start: 2017-01-01 | End: 2017-01-01

## 2017-01-01 RX ORDER — GUAIFENESIN 600 MG
600 TABLET, EXTENDED RELEASE 12 HR ORAL 2 TIMES DAILY
COMMUNITY
Start: 2017-01-01

## 2017-01-01 RX ORDER — SODIUM CHLORIDE 9 MG/ML
INJECTION, SOLUTION INTRAVENOUS
Qty: 1000 ML | Refills: 0 | Status: ON HOLD | OUTPATIENT
Start: 2017-01-01 | End: 2017-01-01

## 2017-01-01 RX ORDER — SODIUM CHLORIDE 9 MG/ML
INJECTION, SOLUTION INTRAVENOUS
Status: DISCONTINUED
Start: 2017-01-01 | End: 2017-01-01 | Stop reason: WASHOUT

## 2017-01-01 RX ORDER — SODIUM CHLORIDE 0.9 % (FLUSH) 0.9 %
3 SYRINGE (ML) INJECTION AS NEEDED
Status: DISCONTINUED | OUTPATIENT
Start: 2017-01-01 | End: 2017-01-01

## 2017-01-01 RX ORDER — METOCLOPRAMIDE HYDROCHLORIDE 5 MG/ML
10 INJECTION INTRAMUSCULAR; INTRAVENOUS EVERY 6 HOURS PRN
Status: CANCELLED | OUTPATIENT
Start: 2017-01-01

## 2017-01-01 RX ORDER — LORAZEPAM 2 MG/ML
INJECTION INTRAMUSCULAR EVERY 4 HOURS PRN
Status: CANCELLED | OUTPATIENT
Start: 2017-01-01

## 2017-01-01 RX ORDER — SODIUM CHLORIDE 9 MG/ML
INJECTION, SOLUTION INTRAVENOUS CONTINUOUS
Status: DISCONTINUED | OUTPATIENT
Start: 2017-01-01 | End: 2017-01-01

## 2017-01-01 RX ORDER — ATORVASTATIN CALCIUM 40 MG/1
40 TABLET, FILM COATED ORAL DAILY
Status: DISCONTINUED | OUTPATIENT
Start: 2017-01-01 | End: 2017-01-01

## 2017-01-01 RX ORDER — PROCHLORPERAZINE MALEATE 10 MG
10 TABLET ORAL EVERY 6 HOURS PRN
Status: CANCELLED | OUTPATIENT
Start: 2017-01-01

## 2017-01-01 RX ORDER — MAGNESIUM OXIDE 400 MG (241.3 MG MAGNESIUM) TABLET
400 TABLET ONCE
Status: DISCONTINUED | OUTPATIENT
Start: 2017-01-01 | End: 2017-01-01

## 2017-01-01 RX ORDER — ETOMIDATE 2 MG/ML
INJECTION INTRAVENOUS
Status: COMPLETED | OUTPATIENT
Start: 2017-01-01 | End: 2017-01-01

## 2017-01-01 RX ORDER — SODIUM CHLORIDE, SODIUM LACTATE, POTASSIUM CHLORIDE, CALCIUM CHLORIDE 600; 310; 30; 20 MG/100ML; MG/100ML; MG/100ML; MG/100ML
INJECTION, SOLUTION INTRAVENOUS CONTINUOUS PRN
Status: DISCONTINUED | OUTPATIENT
Start: 2017-01-01 | End: 2017-01-01 | Stop reason: SURG

## 2017-01-01 RX ORDER — POLYETHYLENE GLYCOL 3350 17 G/17G
17 POWDER, FOR SOLUTION ORAL 2 TIMES DAILY
COMMUNITY

## 2017-01-01 RX ORDER — SODIUM CHLORIDE 450 MG/100ML
INJECTION, SOLUTION INTRAVENOUS
Status: DISCONTINUED
Start: 2017-01-01 | End: 2017-01-01 | Stop reason: WASHOUT

## 2017-01-01 RX ORDER — LORAZEPAM 1 MG/1
TABLET ORAL EVERY 4 HOURS PRN
Status: CANCELLED | OUTPATIENT
Start: 2017-01-01

## 2017-01-01 RX ORDER — ACETAMINOPHEN 325 MG/1
650 TABLET ORAL EVERY 6 HOURS PRN
Status: DISCONTINUED | OUTPATIENT
Start: 2017-01-01 | End: 2017-01-01

## 2017-01-01 RX ORDER — HYDROCODONE BITARTRATE AND ACETAMINOPHEN 5; 325 MG/1; MG/1
1 TABLET ORAL EVERY 4 HOURS PRN
Status: DISCONTINUED | OUTPATIENT
Start: 2017-01-01 | End: 2017-01-01

## 2017-01-01 RX ORDER — METOPROLOL SUCCINATE 25 MG/1
25 TABLET, EXTENDED RELEASE ORAL DAILY
Status: DISCONTINUED | OUTPATIENT
Start: 2017-01-01 | End: 2017-01-01

## 2017-01-01 RX ADMIN — SODIUM CHLORIDE 1000 ML: 9 INJECTION, SOLUTION INTRAVENOUS at 08:45:00

## 2017-01-01 RX ADMIN — SODIUM CHLORIDE 1000 ML: 9 INJECTION, SOLUTION INTRAVENOUS at 08:57:00

## 2017-01-01 RX ADMIN — SODIUM CHLORIDE 1000 ML: 9 INJECTION, SOLUTION INTRAVENOUS at 10:50:00

## 2017-01-01 RX ADMIN — SODIUM CHLORIDE, SODIUM LACTATE, POTASSIUM CHLORIDE, CALCIUM CHLORIDE: 600; 310; 30; 20 INJECTION, SOLUTION INTRAVENOUS at 12:15:00

## 2017-01-01 RX ADMIN — SODIUM CHLORIDE 1000 ML: 9 INJECTION, SOLUTION INTRAVENOUS at 09:00:00

## 2017-01-01 RX ADMIN — SODIUM CHLORIDE 1000 ML: 9 INJECTION, SOLUTION INTRAVENOUS at 15:27:00

## 2017-01-01 RX ADMIN — SODIUM CHLORIDE 1000 ML: 9 INJECTION, SOLUTION INTRAVENOUS at 07:49:00

## 2017-01-01 RX ADMIN — SODIUM CHLORIDE, SODIUM LACTATE, POTASSIUM CHLORIDE, CALCIUM CHLORIDE: 600; 310; 30; 20 INJECTION, SOLUTION INTRAVENOUS at 12:37:00

## 2017-01-01 RX ADMIN — SODIUM CHLORIDE 1000 ML: 9 INJECTION, SOLUTION INTRAVENOUS at 12:57:00

## 2017-01-01 RX ADMIN — SODIUM CHLORIDE 1000 ML: 9 INJECTION, SOLUTION INTRAVENOUS at 07:45:00

## 2017-01-01 RX ADMIN — SODIUM CHLORIDE 1000 ML: 9 INJECTION, SOLUTION INTRAVENOUS at 08:50:00

## 2017-01-01 RX ADMIN — SODIUM CHLORIDE 1000 ML: 9 INJECTION, SOLUTION INTRAVENOUS at 15:35:00

## 2017-01-01 RX ADMIN — SODIUM CHLORIDE 1000 ML: 9 INJECTION, SOLUTION INTRAVENOUS at 07:48:00

## 2017-01-01 RX ADMIN — SODIUM CHLORIDE 1000 ML: 9 INJECTION, SOLUTION INTRAVENOUS at 08:32:00

## 2017-01-01 RX ADMIN — SODIUM CHLORIDE 1000 ML: 9 INJECTION, SOLUTION INTRAVENOUS at 10:10:00

## 2017-01-01 RX ADMIN — SODIUM CHLORIDE 1000 ML: 9 INJECTION, SOLUTION INTRAVENOUS at 15:43:00

## 2017-01-01 RX ADMIN — SODIUM CHLORIDE 1000 ML: 9 INJECTION, SOLUTION INTRAVENOUS at 08:09:00

## 2017-01-01 RX ADMIN — SODIUM CHLORIDE 1000 ML: 9 INJECTION, SOLUTION INTRAVENOUS at 14:30:00

## 2017-04-04 NOTE — PROGRESS NOTES
HPI:    Patient ID: Franki Jurado is a 69year old male. Sore Throat   This is a recurrent problem. The current episode started more than 1 month ago. The problem has been waxing and waning. There has been no fever. The pain is mild.  Associated s Metoprolol Succinate ER (TOPROL XL) 25 MG Oral Tablet 24 Hr Take 25 mg by mouth daily. Disp:  Rfl:      Allergies:Not on File   PHYSICAL EXAM:   Physical Exam   Constitutional: He is oriented to person, place, and time.  He appears well-developed and well-n Skin nasal    growing  Lesion round -  No  Erythema    Psychiatric: He has a normal mood and affect. His behavior is normal.   Nursing note and vitals reviewed.     Blood pressure 143/88, pulse 72, temperature 97.7 °F (36.5 °C), temperature source Oral, res

## 2017-04-17 NOTE — PROGRESS NOTES
Brandy Ziegler is a 69year old male. Patient presents with:  Swollen Glands: swollen salivary glands, LOV 12/17/13    HPI:   He feels that the lump in the right side of his neck is actually increased in size. It is slightly painful at times.  There i Facial features - Normal. Eyebrows - Normal. Skull - Normal.   Oral/Oropharynx Normal Lips - Normal, Tonsils - Normal, Tongue - Normal   Small stone palpable within Lanier's duct on the right   Nasal Normal External nose - Normal. Nasal septum - Normal, T

## 2017-04-19 NOTE — TELEPHONE ENCOUNTER
Order entered for PET scan; Dx:  Head and neck cancer. Pt informed and number given to central scheduling.

## 2017-04-19 NOTE — TELEPHONE ENCOUNTER
----- Message from Nataliya Burks MD sent at 4/19/2017  1:27 PM CDT -----  I spoke with him about the results of the biopsy. I have recommended that he have a PET scan. Please order and let him know when the order is done.

## 2017-04-22 NOTE — PROGRESS NOTES
Quick Note:    Please call patient with test results. Labs are     CBC --blood count is good, no anemia. CMP-sugar- A1 c Mildly elevated , electrolytes, kidney and liver function are normal.    TSH-thyroid hormone- normal level.     Fasting lipids ar

## 2017-04-24 NOTE — TELEPHONE ENCOUNTER
FYi- Pt called to make Dr Vineet Rodríguez with ENT had a BX of nodules- Dx Cancer will have a PET Scan

## 2017-04-24 NOTE — TELEPHONE ENCOUNTER
LMB, Please transfer to ext [phone] today or [phone]. Thank you. Notes Recorded by Mikhail Ness MD on 4/19/2017 at 7:18 AM  Please  Call patient      XR LUMBAR SPINE  Is showing   1. Scoliosis. 2. Minimal osteoarthritis. especially  L5-S1  3. 1.853

## 2017-04-24 NOTE — TELEPHONE ENCOUNTER
----- Message from Shahrzad Carty MD sent at 4/19/2017  7:18 AM CDT -----  Please  Call patient      XR LUMBAR SPINE  Is showing   1. Scoliosis. 2. Minimal osteoarthritis. especially  L5-S1  3.  Atherosclerosis-vascular     Patient referred  To [facility]

## 2017-04-24 NOTE — TELEPHONE ENCOUNTER
Spoke with patient (identified name and ), results reviewed and agrees with plan.       Pt will call back unable to record Dr Hugo Chong # [phone], CSS Please Relay when Pt calls back

## 2017-04-24 NOTE — TELEPHONE ENCOUNTER
----- Message from Lesley Walter MD sent at 4/22/2017  8:35 AM CDT -----  Please  Call  Patient   Thyroid  Gland  US         Is  Showing multinodular  Goiter -  minimally  Enlarged thyroid  Gland  and contains minimal  nodules  -  nodulus are  Too  Sm

## 2017-04-29 NOTE — TELEPHONE ENCOUNTER
Inform the patient that it looks like his cancer is confined to the head and neck and has not \"metastasized\" meaning it hasn't travelled to other areas like the lung liver etc. Dr Jaclyn Greer and His oncologist will discuss this further with him

## 2017-04-29 NOTE — TELEPHONE ENCOUNTER
Pt informed  is out of the office today, but he would still like results of PET/CT scan. Dr. Ricarda Rothman, would you please review pt's PET/CT scan (results in epic) and advise?

## 2017-04-29 NOTE — TELEPHONE ENCOUNTER
Spoke w/ Dr. Adamson Fairly; he will call pt to discuss. Per , PET/CT showed something \"unusual in his throat\"; pt should have laryngoscopy.

## 2017-05-05 NOTE — TELEPHONE ENCOUNTER
Pt is post op day 1 pharyngeal biopsy. Per pt is doing ok, no pain in throat,no fever or bleeding. Pt states was feeling light headed yesterday and diarrhea last night and today, asking if possible to be related to surgery?  Per  does not feel those

## 2017-05-08 NOTE — TELEPHONE ENCOUNTER
Patient requesting the diagnosis that  gave him to be on a written form. States he wants it for his records and would pick it up at the  tomorrow.  Please advise thank you

## 2017-05-08 NOTE — TELEPHONE ENCOUNTER
Referral entered for Rad Onc/Dr. 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 Isra Ballesteros (Dr. Sander Robbins office) informed.

## 2017-05-08 NOTE — TELEPHONE ENCOUNTER
Dr. Tere Ferrer, per Yanelis Avila (Dr. Sherryle Curb office) referral must be entered for pt to see Dr. Raulito Ratliff. Please clarify if the Dx to use is C14.0 (ICD-10-CM) - Malignant neoplasm of pharynx, unspecified.

## 2017-05-11 NOTE — PROGRESS NOTES
Nutrition Plan Of Care:    Problem:  Potential for wt loss    Problems related to:    Side effects of treatment    Interventions:  Monitor and trend weight  Monitor fluid intake/loss  Monitor lab results  Assess dietary needs  Instruct patient on importanc

## 2017-05-11 NOTE — PROGRESS NOTES
Nursing Consultation Note  Patient: Abena Harris  YOB: 1950  Age: 69year old  Radiation Oncologist: Dr. Cameron Ackerman  Referring Physician: No ref.  provider found  Diagnosis:(C01) Malignant neoplasm of base of tongue ([facility] [address]  (pr ASPIRIN 325 MG Oral Tab EC  Disp:  Rfl:    Atorvastatin Calcium 40 MG Oral Tab Take 40 mg by mouth daily. Disp:  Rfl:    hydrochlorothiazide (MICROZIDE) 12.5 MG Oral Cap Take 12.5 mg by mouth daily.  Disp:  Rfl:    aspirin 325 MG Oral Tab Take 325 mg by rona Education:  y    Are ADL's met? Yes  Does patient feel safe in their environment? Yes  Care decisions:  Patient and/or surrogate IS involved in care decisions. Advanced directives:  Patient DOES NOT have advanced directives.   Transportation:  [facility]

## 2017-05-11 NOTE — PROGRESS NOTES
Knowledge Deficit Plan Of Care:    Problem:  Knowledge Deficit    Problems related to:    Radiation therapy  Disease process    Interventions:  Assess patient knowledge level  Assess knowledge needs  Instruct on the disease process  Instruct on medications

## 2017-05-11 NOTE — PROGRESS NOTES
Head and Neck Plan Of Care:    Problem:  Potential for side effects r/t RT/Chemo    Problems related to:    Radiation therapy  Side effects related to radiation therapy  Mucositis  Dysphagia  Odynophagia  Xerostomia    Interventions:   Instructed on basic o

## 2017-05-11 NOTE — TELEPHONE ENCOUNTER
Maria Luisa Grace is requesting per Dr. Soco Thapa a referral   Pt will see Dr Hill [facility] RE--Consult with more visits   Herlinda Viera to put that in Epic     Dr Hill [facility] oncology   Pt appt on 05-18-17

## 2017-05-12 PROBLEM — C01 CANCER OF BASE OF TONGUE (HCC): Status: ACTIVE | Noted: 2017-01-01

## 2017-05-12 NOTE — CONSULTS
[facility]    PATIENT'S NAME: Diana    RADIATION ONCOLOGIST: Laura Wagner.  Yumiko Muir MD   PATIENT ACCOUNT #: [de-identified] LOCATION: [address] [place] [address] RECORD #: [id number] YOB: 1950   CONSULTATION DATE: 05/11/2017 It was noted on the imaging report that this appeared to represent the right submandibular gland but it appears that this is more likely representative of a lymph node as this was the area initially biopsied.   There was a small area of activity within the is afebrile. His weight is 242 pounds. HEENT:  Pupils are equal, round, reactive to light with accommodation. The extraocular movements are intact. On oral cavity exam, he has moist mucous membranes and has no palpable lesions in the oral cavity.   I am it is reasonable to attempt treatment without placement of a feeding tube from the onset of therapy. I told the patient that he will have significant problems with swallowing that will get worse during the course of therapy.   He will lose his sense of tas any metastasis in that location. This would be a highly unusual circumstance but is not entirely impossible.   The patient does report having had some trauma in that location, so the equivocal PET activity in that area is likely related to this, but a plai

## 2017-05-15 NOTE — TELEPHONE ENCOUNTER
Called and spoke to wife to give results of humerus xray, benign enchondroma. Patient has not contacted dentist yet but states that will do so today. To see Dr. Giuseppe Rosario later this week. Anticipate chemo/XRT after dental clearance.   Will do simulation afte

## 2017-05-18 PROBLEM — C10.9 OROPHARYNGEAL CANCER (HCC): Status: ACTIVE | Noted: 2017-01-01

## 2017-05-18 NOTE — PROGRESS NOTES
Cancer Center Consultation Note    Patient Name: Daneil Otero   YOB: 1950   Medical Record Number: [id number]   CSN: [id number]   Consulting Physician: Sasha Montero MD  Referring Physician(s): Anahy Hammond  Date of Consultation: 5 EXCISION OF SALIVARY GLAND  1987       Family Medical History:  Family History   Problem Relation Age of Onset   • Heart Disorder Father    • Heart Disorder Mother    • Cancer Neg    • Bleeding Disorders Neg    • Clotting Disorder Neg          Social Hi redness. Respiratory: Negative for cough, hemoptysis, chest pain, or dyspnea on exertion. Gastrointestinal: Negative for dysphagia, odynophagia, reflux symptoms, nausea, vomiting, change in bowel habits, diarrhea, constipation and abdominal pain.   Integu  04/18/2017 12:12 PM         Lab Results  Component Value Date/Time   GLU 99 04/18/2017 12:12 PM   BUN 12 04/18/2017 12:12 PM   CREATSERUM 0.81 04/18/2017 12:12 PM   GFRNAA >60 04/18/2017 12:12 PM   CA 10.3 04/18/2017 12:12 PM   ALB 4.4 04/18/2017 Vacectomy clips.  Small right inguinal hernia containing fat.  28 x 53 mm lipoma in the right gluteus medius muscle.     Small fat containing umbilical hernia.  No pathologic FDG activity.     MUSCULOSKELETAL:   Small 2-3 mm calcifications in the shoulders 5/13/17  CONCLUSION:    1. A 3.5-4 cm proximal left humerus lesion with chondroid calcification compatible with enchondroma. 2. Left a.c. joint osteoarthritis.     Pathology:    Final Diagnosis:         A.  Tongue, right base, biopsy:  · Invasive poorly di patient that we will plan to cisplatin 100 mg/m² delivered every 21 days for total of 3 cycles  --Discussed the side effect profile cisplatin to include but not limited to: Nausea, vomiting, fatigue, cytopenias with increased risk for infection, ototoxicit

## 2017-05-18 NOTE — PROGRESS NOTES
Initial meeting. [facility] referral sent per patient request. Anxious to begin treatments. Reviewed process of insurance auth, CT sim and chemo ed. Chemo Ed appt made for Monday. Instructed on ACS resources and EULOGIO video to watch.  Phone list with resources revi

## 2017-05-18 NOTE — PROGRESS NOTES
Patient reports that he has seen his dentist: Dr Aury Watson DDS on Monday 5/15/17 and she found a small cavity. Returning to dentist on Monday 5/22/17 for filling. Update to Macie Philippe RN in [place] who will call for dental clearance.  Peripheral arm ve

## 2017-05-18 NOTE — TELEPHONE ENCOUNTER
Cipriano Galvan from Dr SUERO [facility] office is calling   Pt was seen for his first appt today   Please make sure referral has Start date if 05-17-17

## 2017-05-22 NOTE — PATIENT INSTRUCTIONS
Medication Education Record: IV Therapy    Learner:  Patient and Spouse    Barriers / Limitations:  None    Diagnosis:   Base of tongue cancer    IV Cancer Treatment Name(s): Cisplatin  IV Cancer Treatment Frequency every 3 weeks  Number of cycles planned Imodium AD or more than 6 episodes in 24 hours  • Constipation –no bowel movement x 3 days, no response to stool softeners or laxatives  • Mouth sores, sore throat or blisters on the lips  • Difficulty breathing, chest pain, or new cough  • Excessive tired feel stronger. Listen to your body and rest when you need to. Do what you can when you feel up to it. Oral Care  o Keep your mouth clean.   Rinse your mouth before and after meals with plain water or with a mild mouth rinse (made with 1 quart water, the tubing and turning it off.  c. Cover the connection with a paper towel and a plastic bag.  d. Refer to the Chemotherapy Spill Kit given to you. 3. Call the infusion pump company for further instructions.   Contact your doctor’s office with further ques addition, menstrual cycles can become irregular during and after treatment, so you may not know if you are at a time in your cycle when you could become pregnant or if you are actually pregnant.

## 2017-05-22 NOTE — PROGRESS NOTES
Medication Education Record: IV Therapy    Learner:  Patient and Spouse    Barriers / Limitations:  None  Perhaps some emotional factors. Wife seemed somewhat overwhelmed vs. Wife.       Diagnosis:   Base of tongue cancer    IV Cancer Treatment Name(s): Ci dry mouth, dark and decreased amount of urine  • Diarrhea – not controlled with Imodium AD or more than 6 episodes in 24 hours  • Constipation –no bowel movement x 3 days, no response to stool softeners or laxatives  • Mouth sores, sore throat or blisters active as you can, but start out slowly, and increase your activity over time as you feel stronger. Listen to your body and rest when you need to. Do what you can when you feel up to it. Oral Care  o Keep your mouth clean.   Rinse your mouth before a connection is leaking:  a. Put on disposable gloves  b. Stop the pump by clamping the tubing and turning it off.  c. Cover the connection with a paper towel and a plastic bag.  d. Refer to the Chemotherapy Spill Kit given to you.   3. Call the infusion pump have harmful side effects to the fetus, especially in the first trimester.   In addition, menstrual cycles can become irregular during and after treatment, so you may not know if you are at a time in your cycle when you could become pregnant or if you are a

## 2017-05-30 NOTE — TELEPHONE ENCOUNTER
Advised that anti nausea medications are ready for . Do not take yet, will start tomorrow when he gets home from treatment. Advised to eat breakfast, something easy to tolerate and to bring lunch as it is a long day.  He will take his morning medicat

## 2017-05-31 NOTE — PROGRESS NOTES
Pt arrived for C1D1 Cisplatin with concurrent RT. Pt offers no complaints, states he has no symptoms from his cancer at this point. Is anxious to get started. Reviewed Cisplatin with his, chemo care handout given.  Reviewed the use of Zofran and Compazine,

## 2017-05-31 NOTE — PATIENT INSTRUCTIONS
.  Safe Handling of Chemotherapy  While you or your family member is receiving chemotherapy, whether in the clinic or at home, the following precautions must be taken to lessen any exposure to the medication.      Safety and Handling of Chemotherapy  Handli become irregular during and after treatment, so you may not know if you are at a time in your cycle when you could become pregnant or if you are actually pregnant.

## 2017-05-31 NOTE — PROGRESS NOTES
22g angiocatheter inserted to rt forearm vein without difficulty, labs collected upon insertion, angiocatheter capped and flushed with saline. Dsg secured with coban. Discharged amb and stable with wife to infusion room 10.

## 2017-06-05 PROBLEM — E86.0 DEHYDRATION: Status: ACTIVE | Noted: 2017-01-01

## 2017-06-05 NOTE — TELEPHONE ENCOUNTER
Tox check, Cycle 1, day 6. Patient reports having a \"rough\" weekend in regard to nausea and feeling weak. States that compazine was not working, but ondansetron was working better.   Encouraged to use the ondansetron for nausea control in future and not

## 2017-06-06 NOTE — PROGRESS NOTES
Pt presents to infusion today for IV hydration to treat nausea related to chemotherapy and radiation. Pt states that he is doing much better today than he did yesterday. After radiation yesterday pt went and purchased ensure.   He drank the ensure around

## 2017-06-06 NOTE — PROGRESS NOTES
[facility] Radiation Treatment Management Note 1-5    Patient:  Chuy Ferguson  Age:  69year old  Visit Diagnosis:    1.  Malignant neoplasm of base of tongue ([address]      Primary Rad/Onc:  Dr. Estella Lorenzo      Site Delivered Dose

## 2017-06-09 NOTE — PROGRESS NOTES
Francia Budge to infusion today for hydration due to nausea r/t chemotherapy and radiation. He arrives alert and independent, accompanied by his son today. Orthostatic vital signs done- patient is slightly orthostatic but asymptomatic.  See flowsheet for documen

## 2017-06-12 NOTE — PROGRESS NOTES
Patient arrives for scheduled hydration. Patient denies complaints, reports he is feeling well. States he does have a sore throat but states he is still trying to drink and eat adequate amounts. Orthostatic vitals taken, negative for orthostatic.  Patient r

## 2017-06-13 NOTE — PROGRESS NOTES
[facility] Radiation Treatment Management Note 6-10    Patient:  Shawn Verde  Age:  69year old  Visit Diagnosis:    1.  Malignant neoplasm of base of tongue ([address]      Primary Rad/Onc:  Dr. Arvind Catalan

## 2017-06-15 NOTE — PROGRESS NOTES
Tejinder Villarreal presents today from radiation for hydration. Orthostatic vital signs completed; stable refer to charting. States has been feeling better and may not need hydration on Monday, 6/19.  This rn advised Tejinder Villarreal to keep appointment at this time, when he c

## 2017-06-16 PROBLEM — I70.0 ATHEROSCLEROSIS OF AORTA (HCC): Status: ACTIVE | Noted: 2017-01-01

## 2017-06-16 PROBLEM — F17.201 TOBACCO DEPENDENCE IN REMISSION: Status: ACTIVE | Noted: 2017-01-01

## 2017-06-16 PROBLEM — R73.9 HYPERGLYCEMIA: Status: ACTIVE | Noted: 2017-01-01

## 2017-06-20 PROBLEM — R22.1 MASS OF RIGHT SIDE OF NECK: Status: ACTIVE | Noted: 2017-01-01

## 2017-06-20 PROBLEM — E86.0 DEHYDRATION: Status: RESOLVED | Noted: 2017-01-01 | Resolved: 2017-01-01

## 2017-06-20 PROBLEM — I25.10 CORONARY ARTERY DISEASE INVOLVING NATIVE HEART WITHOUT ANGINA PECTORIS: Status: ACTIVE | Noted: 2017-01-01

## 2017-06-20 PROBLEM — I10 HTN (HYPERTENSION), BENIGN: Status: ACTIVE | Noted: 2017-01-01

## 2017-06-20 NOTE — PROGRESS NOTES
HPI:   Danielle Michel is a 69year old male who presents for a Medicare Subsequent Annual Wellness visit (Pt already had Initial Annual Wellness).     Patient patient presents today for physical exam, states  Going  thrue  chimo and  rtg th  For   Thr Ondansetron HCl (ZOFRAN) 8 MG tablet Take 1 tablet (8 mg total) by mouth every 8 (eight) hours as needed for Nausea. Fish Oil-Cholecalciferol (FISH OIL + D3 OR) Take 1 tablet by mouth daily.    ibuprofen 200mg    PX ENTERIC ASPIRIN 325 MG Oral Tab EC thyroid history  ALL/ASTHMA: denies hx of allergy or asthma    EXAM:   BP 99/66 mmHg  Pulse 80  Temp(Src) 98.6 °F (37 °C) (Oral)  Resp 20  Ht 6' 2\" (1.88 m)  Wt 235 lb (106.595 kg)  BMI 30.16 kg/m2  Estimated body mass index is 30.16 kg/(m^2) as calculate Acuity: Yes      General Appearance:  Alert, cooperative, no distress, appears stated age   Head:  Normocephalic, without obvious abnormality, atraumatic   Eyes:  PERRL, conjunctiva/corneas clear, EOM's intact, both eyes   Ears:  Normal TM's and external e lesion  Derm Referral - In Network    HTN (hypertension), benign  Take high blood pressure medication as perscribed   Low- sodium diet (2grams per day)   Maintain a low fat diet   Maintain ideal weight   Regular walking/exercise as tolerated   Track and re good energy level?: Appropriate Exercise    How would you describe your daily physical activity?: Light    How would you describe your current health state?: Poor    How do you maintain positive mental well-being?: Social Interaction;Puzzles; Visiting Rachel Briscoe explained in detailed)    Do you have a living will?: No (form was given to the pt and explained in detailed)     Please go to \"Cognitive Assessment\" under Medicare Assessment section in Charting, test patient and document.     Then, refresh your progress found for this or any previous visit. Pneumococcal 23 (Pneumovax) No orders found for this or any previous visit. Hepatitis B No orders found for this or any previous visit. Tetanus No orders found for this or any previous visit.          SPECI

## 2017-06-20 NOTE — PROGRESS NOTES
[facility] Radiation Treatment Management Note 11-15    Patient:  Robert Benavides  Age:  69year old  Visit Diagnosis:    1.  Malignant neoplasm of base of tongue ([address]      Primary Rad/Onc:  Dr. Arelis Nguyen      Site Delivered Do

## 2017-06-20 NOTE — PROGRESS NOTES
HPI:    Patient ID: Daniel Otero is a 69year old male. HPI    Review of Systems         Current Outpatient Prescriptions:  Lidocaine Viscous 2 % Mouth/Throat Solution Take 5 mL by mouth as needed for Pain.  Disp: 1 Bottle Rfl: 2   nystatin [id number]

## 2017-06-21 NOTE — PROGRESS NOTES
Pt presents to infusion today for C2D1 Cisplatin. Pt appears well today and denies any nausea/vomiting/constipation/diarrhea. Pt denies any fevers or flu like symptoms. After last round of chemo, pt was nauseated and vomiting for 4 days following.   He i integrity  adequate sleep/rest  family support/coping well  free of infection  maintains/gains weight  no active bleeding  nutritional needs met  optimal lab values  oral membranes intact  patient supported/coping well  symptoms relieved/minimized  underst

## 2017-06-21 NOTE — PROGRESS NOTES
Patient here for lab draw and chemo appointment. Ambulated from waiting area with steady gait. IV placed in left hand with 2 attempts. Patient tolerated well. Labs drawn and sent (CBC, CMP). Patient escorted back to lobby to wait for next appointment.

## 2017-06-21 NOTE — PROGRESS NOTES
Cancer Center Progress Note    Patient Name: Abena Harris   YOB: 1950   Medical Record Number: [id number]   Consulting Physician: Liz Almaguer MD  Referring Physician(s): Marck Zimmerman  Date of Visit: 6/21/2017     Chief Compla Denies any chest pain, dyspnea, abdominal pain, change in bowel habits, change in skin or rash, no neurological or hearing loss complaints. Diandra Dinh reports remaining quite functional and active with very little change in his quality of life.   His weight is n reflux symptoms, change in bowel habits, diarrhea, constipation and abdominal pain. +nausea, vomiting following chemotherapy  Integument/breast: Negative for rash, skin lesions, and pruritus.   Hematologic/lymphatic: Negative for easy bruising, bleeding, +r 06/21/2017 08:37 AM   CREATSERUM 0.83 06/21/2017 08:37 AM   GFRNAA >60 06/21/2017 08:37 AM   CA 9.6 06/21/2017 08:37 AM   ALB 3.9 06/21/2017 08:37 AM   * 06/21/2017 08:37 AM   K 4.5 06/21/2017 08:37 AM   CL 98 06/21/2017 08:37 AM   CO2 25 06/21/2017 response  --PET scan is indicative of only local regional disease no evidence of distant disease reported    2.) CAD -s/p multi-vessel stents    --Patient is on multiple cardiac agents which are required based on his known coronary artery disease  --Discus

## 2017-06-23 NOTE — PROGRESS NOTES
Pt presents to infusion today for IV hydration to treat nausea related to chemotherapy and radiation.  Pt states that he is lightheaded today but denies any nausea/vomiting.   He has been taking the Zofran consistently and is able to drink water.  Pt feels

## 2017-06-27 NOTE — PROGRESS NOTES
Pt presents to infusion today for IV hydration to treat nausea related to chemotherapy and radiation. Pt states that he is doing much better today than he did yesterday. Pt stated that Sunday was his worst day, he was unable to eat anything.   pt states h

## 2017-06-27 NOTE — PROGRESS NOTES
[facility] Radiation Treatment Management Note 16-20    Patient:  Brandon Gottron  Age:  69year old  Visit Diagnosis:    1.  Malignant neoplasm of base of tongue ([address]      Primary Rad/Onc:  Dr. Luke Forbes      Site Delivered Do a hard time with his last cycle of chemotherapy. Not eating well since that time, reports that he is \"gaggin\" a lot, though he differentiates this from nausea. Some throat pain but he doesn't feel that this limits his eating.   Due to \"gagging,\" his c

## 2017-06-27 NOTE — PROGRESS NOTES
Safety Plan Of Care:    Safety Problem:  Risk of injury  Risk of fall    Related to:    Disease process  Feels weak not eating well.      General Safety Interventions:  Provide safe environment while in department  Provide escort while in [address]

## 2017-06-28 NOTE — TELEPHONE ENCOUNTER
Sent to Dr Holly Leon, office on call. Manuel Blow from Dr Mirna Mercado office called (ext [phone]). Pt needs appt re placement PEG tube. Head/neck CA--has had 2 cycles of chemo and 3 1/2 weeks radiation. Has dysphagia, worsening, but still taking food orally.   I pu

## 2017-06-28 NOTE — TELEPHONE ENCOUNTER
Spoke with pt and offered appt this Friday with PL @ 2:30pm which he accepted.  [facility] location reviewed    LM for Ktaey to open up the spot

## 2017-06-28 NOTE — TELEPHONE ENCOUNTER
This patient has seen Dr Jennifer Barrett  - last seen for colonoscopy 7/2015.  Routed to Dr Jennifer Barrett to advise on when pt can be added

## 2017-06-28 NOTE — TELEPHONE ENCOUNTER
Made pt aware Dr. Alia Vasquez nurse to assist with referral to Dr. Messi Sims for g tube, msg left for pt.

## 2017-06-29 NOTE — PROGRESS NOTES
Pt refused fluids today states he feels fine will go home and drink water and gatorade. He will get fluids tomorrow.

## 2017-06-30 NOTE — TELEPHONE ENCOUNTER
I do not see this was sent to pharmacy  Looks like it printed on NS2 printed.  Faxed to Endoscopy today @ [phone] for [facility]

## 2017-06-30 NOTE — TELEPHONE ENCOUNTER
Scheduled for:  Jacob Patel [phone] for placement of PEG TUBE  Provider Name:  Date:  7/3/17  Location:  [facility]  Sedation:  Mac Sedation  Time:  11:30 AM Antoine Alar 10:30 AM  Prep:EGD -NPO AFTER MIDNIGHT  Meds/Allergies Reconciled?:  Physician reviewed  Diagnosis

## 2017-06-30 NOTE — PROGRESS NOTES
Jodi Arroyo presents today from radiation for hydration. Orthostatic vital signs completed; refer to vital sign charting. Having difficulty taking any fluid/food at home. States not due to nausea but able to swallow.  Has appointment this afternoon with GI for

## 2017-07-01 NOTE — PROGRESS NOTES
Etienne Eisenberg is a 69year old male. HPI:   Patient presents with:  Cath Tube Problem (gastrointestinal, urinary, integumentary)      The patient is a 25-year-old male who has a history of head and neck cancer who has been started on therapy.   He sodium chloride 0.9 % Intravenous Over 1 hour for dehydration Disp: 1000 mL Rfl: 0   Lidocaine Viscous 2 % Mouth/Throat Solution Take 5 mL by mouth as needed for Pain.  Disp: 1 Bottle Rfl: 2   Ondansetron HCl (ZOFRAN) 8 MG tablet Take 1 tablet (8 mg total which is causing difficulty swallowing. Discussed I may not be able to pass the scope if there is significant stenosis or narrowing in the posterior oropharynx and/or upper esophageal region.   The risks and benefits of the procedure outlined including ris

## 2017-07-03 PROBLEM — R13.10 DYSPHAGIA: Status: ACTIVE | Noted: 2017-01-01

## 2017-07-03 NOTE — CONSULTS
[facility] AND [facility]  Report of Consultation    [address] Patient Status:  Hospital Outpatient Surgery    1950 MRN [id number]   Location [facility] ENDOSCOPY LAB SUITES Attending Marie Jha MD   Hosp Day # 0 PCP Lary Gonzalez difficulty swallowing, irregular bowel habits, painful swallowing, diarrhea, abdominal pain, constipation, nausea, incontinence of stool, vomiting, black stools, get full quickly at meals, blood in stools, abdominal distention, jaundice, flatulence, vomiti anaphylactic or other reaction to anesthesia, food allergy. Eyes: Denies blurred/double vision, eye disease, glasses or contacts, glaucoma. ENT: Denies nose or gums bleeding, mouth sores, bad breath or bad taste in mouth, hearing loss.   Physical Exam:

## 2017-07-03 NOTE — ANESTHESIA PREPROCEDURE EVALUATION
Anesthesia PreOp Note    HPI:     Yanci Rich is a 69year old male who presents for preoperative consultation requested by: Joceline Choudhary MD    Date of Surgery: 7/3/2017    Procedure(s):  ESOPHAGOGASTRODUODENOSCOPY (EGD)  PERCUTANEOUS ENDOSCO Rfl: 2 6/27/2017   Ondansetron HCl (ZOFRAN) 8 MG tablet Take 1 tablet (8 mg total) by mouth every 8 (eight) hours as needed for Nausea. Disp: 60 tablet Rfl: 1 6/27/2017   Fish Oil-Cholecalciferol (FISH OIL + D3 OR) Take 1 tablet by mouth daily.  Disp:  Rfl: the boys. Available pre-op labs reviewed.     Lab Results  Component Value Date   WBC 2.8 (L) 06/21/2017   RBC 4.44 (L) 06/21/2017   HGB 12.9 (L) 06/21/2017   HCT 37.1 (L) 06/21/2017   MCV 83.7 06/21/2017   MCH 29.0 06/21/2017   MCHC 34.7 06/21/2017

## 2017-07-03 NOTE — PROGRESS NOTES
[facility] Radiation Treatment Management Note 21-25    Patient:  Shawn Verde  Age:  69year old  Visit Diagnosis:  No diagnosis found.   Primary Rad/Onc:  Dr. Jose Westfall Lehman      Site Delivered Dose (Gy) Prescribed Dose (Gy) Fraction #

## 2017-07-03 NOTE — DISCHARGE PLANNING
DENNIS received a call from [address] [place] [address] regarding the pt's new tube feedings. The pt. Had a gtube placed today due to head and neck cancer. Per Sarahi plan is for the pt. To discharge home on Ensure+ 7 cans per day.   DENNIS made a referral to [facility] to

## 2017-07-03 NOTE — DIETARY NOTE
ADULT NUTRITION INITIAL ASSESSMENT    Pt is at high nutrition risk. Pt does not meet malnutrition criteria.       RECOMMENDATIONS TO MD: RD to order and manage tube feeding, RD seeing patient weekly at radiation treatments     NUTRITION DIAGNOSIS/PROBLEM: ANTHROPOMETRICS:  HT: 188 cm (6' 2\")  WT: 99.9 kg (220 lb 3.2 oz)   BMI: Body mass index is 28.27 kg/m².   BMI CLASSIFICATION: 25-29.9 kg/m2 - overweight  IBW: 190 lbs        116% IBW  Usual Body Wt: 240 lbs       92% UBW    WEIGHT HISTORY:  Patient We intake.   - Food and Nutrient Administration:      Monitor: adequacy of enteral nutrition and for enteral nutrition adjustment  - Anthropometric Measurement:      Monitor: wt and wt change  - Nutrition Goals:      halt wt loss, tube feed meets greater than

## 2017-07-03 NOTE — ANESTHESIA POSTPROCEDURE EVALUATION
Patient: Nadir Cassidy    Procedure Summary     Date:  07/03/17 Room / Location:  [address] [place] [address] ENDOSCOPY 01 / [address] [place] [address] ENDOSCOPY    Anesthesia Start:  2744 Anesthesia Stop:  5748    Procedures:       ESOPHAGOGASTRODUODENOSCOPY (EGD) (N/A )      PERCUTANEOUS ENDOS

## 2017-07-03 NOTE — OPERATIVE REPORT
[facility] Endoscopy Report      Preoperative Diagnosis:  - Head and neck cancer on therapy  - dysphagia and weight loss      Postoperative Diagnosis:  - PEG placement      Procedure:    Esophagogastroduodenoscopy + PEG placement      Surge PEG placement    Recommendations:  - Post PEG instructions given  - Adm for observation  - Instructions on tube use        Kenneth Rao.  Gloria Stewart MD  7/3/2017  12:40 PM

## 2017-07-04 NOTE — PROGRESS NOTES
[facility]     Gastroenterology Progress Note    Yi Surype Patient Status:  Observation    1950 MRN [id number]   Location [facility] Attending Kristine Rojas MD   Hosp Day # 0 PCP Mariangel Walker MD 07/04/2017    07/04/2017   K 3.6 07/04/2017    07/04/2017   CO2 24 07/04/2017   GLU 95 07/04/2017   CA 8.9 07/04/2017   ALB 3.9 06/21/2017   ALKPHO 86 06/21/2017   BILT 1.0 06/21/2017   TP 7.3 06/21/2017   AST 26 06/21/2017   ALT 32 06/21/2017

## 2017-07-04 NOTE — DISCHARGE SUMMARY
[facility]    Discharge Summary    [address] Patient Status:  Observation    1950 MRN [id number]   Location [address] [facility] Attending Edita Abdi MD   Hosp Day # 0 PCP Cristian Bryan MD     Date of Admission: up with heme/onc as an outpatient     Complications: none       Surgical Procedures     Case IDs Date Procedure Surgeon Location Status    [id number] 7/3/17 ESOPHAGOGASTRODUODENOSCOPY (EGD) Sofie Jordan MD [address] [place] [address] ENDOSCOPY Luis            Discharge Plan:   Sharon Morgan Follow up Labs and imaging:         Time spent:  > 30 minutes    Stephenie Guerra  7/4/2017

## 2017-07-04 NOTE — PLAN OF CARE
Problem: PAIN - ADULT  Goal: Verbalizes/displays adequate comfort level or patient's stated pain goal  INTERVENTIONS:  - Encourage pt to monitor pain and request assistance  - Assess pain using appropriate pain scale  - Administer analgesics based on type coordinating discharge planning if the patient needs post-hospital services based on physician/LIP order or complex needs related to functional status, cognitive ability or social support system  Outcome: Progressing      Comments: G-tube in place, intact.

## 2017-07-04 NOTE — PLAN OF CARE
Problem: Patient/Family Goals  Goal: Patient/Family Long Term Goal  Patient's Long Term Goal: To tolerate tube feedings. Interventions:  - Flush tube and administer feedings as instructed.     - See additional Care Plan goals for specific interventions appropriate resources  INTERVENTIONS:  - Identify barriers to discharge w/pt and caregiver  - Include patient/family/discharge partner in discharge planning  - Arrange for needed discharge resources and transportation as appropriate  - Identify discharge l

## 2017-07-05 NOTE — TELEPHONE ENCOUNTER
I [phone]. States Dr Messi Sims placed PEG tube on Monday and wondered if we set up tube feedings, nutrition consult/teaching. I explained that this was done by endoscopy dept while pt in for procedure.  She w

## 2017-07-06 NOTE — PROGRESS NOTES
Wt Readings from Last 10 Encounters:  07/06/17 : 101.2 kg (223 lb)  07/03/17 : 99.9 kg (220 lb 3.2 oz)  07/03/17 : 99.8 kg (220 lb)  06/30/17 : 103 kg (227 lb)  06/29/17 : 102.6 kg (226 lb 3.2 oz)  06/27/17 : 103.3 kg (227 lb 12.8 oz)  06/22/17 : 107.2 kg

## 2017-07-07 NOTE — TELEPHONE ENCOUNTER
Patient was recently discharged from hospital on July 4. Patient had a G tube placed by Dr Melissa Bueno on July 3. Because of patient's HMO Medicare insurance, [facility] is unable to initiate any infusions/feedings until referral has been approved.   Needs refer

## 2017-07-07 NOTE — TELEPHONE ENCOUNTER
Pt states that Dr. Ish Salmeron told him to schedule an appt 2 wks after having PEG tube placed. No appt available with PL. Please call.

## 2017-07-07 NOTE — TELEPHONE ENCOUNTER
Pina Pedersen saw Dr. Carmen Felton passing through in the hospital and he told him to contact his nurse regarding a mouthwash, please call Pina Pedersen.

## 2017-07-07 NOTE — TELEPHONE ENCOUNTER
msg left on vm, he would only need to follow up in the office if there is an issue with the tube. Otherwise he can follow up when has completed treatment and can have the tube removed. Please make sure that he was educated on how to use the tube.  The andrei

## 2017-07-10 NOTE — TELEPHONE ENCOUNTER
Pt and spouse contacted and given message below. Pt received teaching and has been doing own tube feedings. Will call post treatment and when we have clearance from oncology.

## 2017-07-11 NOTE — TELEPHONE ENCOUNTER
Please see below. The patient is on Metoprolol Succinate ER (TOPROL XL) 25 MG Oral Tablet 24 Hr  Which cannot be crushed. Do you want him to crush all his medications?

## 2017-07-11 NOTE — PROGRESS NOTES
Pain Plan Of Care:    Related to:    chemo/rt    Pain Problem Interventions:  Assess pain  Instruct patient/family to take pain meds prior to radiation therapy  Instruct on ATC as needed for pain management  Asses response to pain management  Instruct donavan

## 2017-07-11 NOTE — TELEPHONE ENCOUNTER
Suri Simpson from [facility] calling and stts the pt is currently going thru chemo for cancer of the tongue and can not have anything by mouth. PT has a g-tube. Pt has not been taking his cardiac medication. Pt resting heart rate is 110.   Pleas

## 2017-07-12 PROBLEM — Z09 CHEMOTHERAPY FOLLOW-UP EXAMINATION: Status: ACTIVE | Noted: 2017-01-01

## 2017-07-12 NOTE — PROGRESS NOTES
JULIA   Rose Palomo is a 69year old male that was seen today in the [facility] for evaluation of the above complaint.   Miguelangel Castro reports being in his usual state of health over the last few months until he recently noticed increased swelling in his Outpatient Prescriptions:  metoprolol Tartrate 25 MG Oral Tab Take by GI tube 1/2 tablet (12.5 mg) 2 times a day Disp: 90 tablet Rfl: 0   HYDROcodone-acetaminophen 5-325 MG Oral Tab Take 1 tablet by mouth every 4 (four) hours as needed for Pain.  Disp:  Rfl x 43 yrs. He is the father of 6 adult children, 4 are in the [address] [place] [address]. He works a  for a [employer]. Chip Primrose takes care of his toodler grandson after work daily. He enjoy golfing, every other week plays cards with the boys. collaborate with him to provide systemic chemotherapy using cisplatin as a radiosensitizer to provide a synergistic benefit to treat patients had neck cancer  --Discussed with patient that we will plan to cisplatin 100 mg/m² delivered every 21 days for tot - 18 mmol/L   BUN/CREA Ratio 15.2 10.0 - 20.0   Calculated Osmolality 280 275 - 295 mOsm/kg   GFR, Non-African American >60 >=60   GFR, -American >60 >=60   -CBC W/ DIFFERENTIAL   Collection Time: 07/12/17  8:39 AM   Result Value Ref Range   WBC 3.3

## 2017-07-12 NOTE — TELEPHONE ENCOUNTER
Please speak to the patient, I assume he has G-tube, if he does not take metoprolol ER 25 mg daily which should not be crushed we can change prescription for metoprolol tartrate 25 mg take  1/2 tabevery 12 hours , this medication could be be crushed and us

## 2017-07-12 NOTE — PROGRESS NOTES
Pt here for C3 cisplatin pre-chemo labs, visit with Adonay Stevens NP, then chemotherapy appt. He had RT this am.  He reports difficulty speaking due to throat dryness, discomfort, pain.   He brought towels, fluids - in case needing to expectorate etc.    PIV pl

## 2017-07-12 NOTE — TELEPHONE ENCOUNTER
Patient stated he could not crush the pill enough to get down so has not been taking. Discussed needs to take his medication. Instructed to buy a pill cutter for his metoprolol XR cannot be crushed.    Metoprolol Tartrate sent to the pharmacy and the

## 2017-07-12 NOTE — PROGRESS NOTES
Pt arrived for C3D1 Cisplatin with concurrent RT. Pt states he is feeling very run down, tired. He has been able to tolerate the tube feedings well and getting the necessary cans in per day. Reviewed the importance of increasing oral intake.  Pt is anxious

## 2017-07-14 NOTE — PROGRESS NOTES
Patient arrives for daily hydration. Patient reports he is okay. Reports he is taking feedings/fluids through his PEG tube. Denies any nausea or vomiting today. Patient negative for orthostatic hypotension. Denies any dizziness.  PIV started in left forearm

## 2017-07-18 NOTE — PROGRESS NOTES
Patient here for Hydration. Patient reports he is feeling lightheaded today. Patient receiving RT for cancer of Tongue. Patient states last RT tomorrow. Patient is unable to swallow, reports thick oral secretions that he is unable to expectorate.  Henrik

## 2017-07-18 NOTE — PROGRESS NOTES
[facility] Radiation Treatment Management Note 31-35    Patient:  Abena Harris  Age:  69year old  Visit Diagnosis:    1.  Malignant neoplasm of base of tongue ([address]      Primary Rad/Onc:  Dr. Cameron Ackerman    Site Delivered Minimal throat pain. Sense of taste impaired. Objective:  Skin intact, no thrush. Thickened and ropy salivary secretions. Treatment setup imaging have been reviewed:   Yes    Assessment/Plan:    Completes tomorrow    Continue multiple attempts

## 2017-07-19 NOTE — TELEPHONE ENCOUNTER
The pt should go through the cancer center/oncology for management of nutrition   Please forward the enteral feeding order to cancer center. Dr Evelyn Yan is his primary oncologist I believe.

## 2017-07-20 NOTE — TELEPHONE ENCOUNTER
I spoke to [facility] at [facility] and she gave me Dr Mikki Evans fax [phone]. I faxed below enteral order re tube feedings to him.

## 2017-07-21 NOTE — PROGRESS NOTES
Pt to infusion for hydration. Arrived stable and ambulatory. Denies any dizziness or lightheadedness with standing. See flow-sheet for orthostatic vital signs. States he cannot swallow, is using G-tube for water and Ensure 4-5 cans daily.  Reports thick m

## 2017-07-25 NOTE — PROGRESS NOTES
Pt presents to infusion today for IV hydration. Pt states that he is not doing well today. Pt takes in on average 4-5 cans of ensure and 800-1000 ml of water via g-tube daily.   Pt states that he has thick secretions that cause him to gag, leading to him

## 2017-07-28 NOTE — PROGRESS NOTES
Patient here for hydration. Patient continues to struggle with G-Tube feeding. States secretion in back of throat are thick and he starts to gag, then feeding come back up. Patient states he is doing 3 cans of Ensure and seems to be better.  Patient stat

## 2017-07-28 NOTE — PATIENT INSTRUCTIONS
RT follow-up on 8/17/17 at 8:00  Take Mucinex 4 times a day  Do mouth care before you do feeding, baking sode salt rinse.   1 tsp salt with 1 tsp baking soda in 1 quart of water rinse 4 times per day  Biotene 2 times a day  Put Gatorade into Feeding tube

## 2017-07-28 NOTE — PROGRESS NOTES
[facility]    PATIENT'S NAME: Matacharisma Montoya   RADIATION ONCOLOGIST: Tobias Richmond MD   PATIENT ACCOUNT #: [de-identified] LOCATION: [address] [place] [address] RECORD #: [id number] YOB: 1950   DATE: 07/19/2017       RADIATION Stanley Alberto chemoradiotherapy as below. TECHNICAL SUMMARY:  The patient was treated to the base of tongue and bilateral neck to a dose of 7000 cGy in 200 cGy daily fractions.   This was done as part of a simultaneously integrated boost plan with dose gradients of 54 any problems or questions prior to that time. Thank you very much for allowing me the opportunity to participate in the care of this patient. If there should be any questions regarding the radiotherapy, please feel free to contact me at any time.

## 2017-08-02 NOTE — PROGRESS NOTES
Patient to infusion for weekly Hydration. Lindsay Morton arrived ambulatory . He reports on going fatigue. He continues oral care with baking soda and water several times per day. Unable to eat or  drink.  He flushes G tube with water and takes 3 cans of ensure da

## 2017-08-02 NOTE — PROGRESS NOTES
Cancer Center Progress Note    Patient Name: Diallo Yusuf   YOB: 1950   Medical Record Number: [id number]   Consulting Physician: Leonora Swartz MD  Referring Physician(s): Suzanne Salter  Date of Visit: 8/2/2017     Chief Complai guaifenesin. Denies any chest pain, dyspnea, abdominal pain, mentions alterations with constipation and diarrhea with use of Metamucil, mentions some post radiation skin changes,  no neurological or hearing loss complaints.   Bill reports remaining quite f radiation treatment areas  Hematologic/lymphatic: Negative for easy bruising, bleeding, +improved right cervical lymphadenopathy. Musculoskeletal: Negative for myalgias, arthralgias, muscle weakness.   Neurological: Negative for headaches, dizziness, speec 12:30 PM   BUN 15 08/02/2017 12:30 PM   CREATSERUM 0.97 08/02/2017 12:30 PM   GFRNAA >60 08/02/2017 12:30 PM   CA 9.5 08/02/2017 12:30 PM   ALB 3.6 08/02/2017 12:30 PM    (L) 08/02/2017 12:30 PM   K 3.5 08/02/2017 12:30 PM   CL 94 (L) 08/02/2017 12:3 treated the pt with cisplatin 100 mg/m² delivered every 21 days for total of 3 cycles  --Goal of therapy is for a complete response  --PET scan was indicative of only local regional disease no evidence of distant disease reported at the time prior to treat [address] [facility]

## 2017-08-02 NOTE — PROGRESS NOTES
Pt here for labs, place PIV, then see oncologist and hydration appt today. Pt states he has been using the mucinex 3-4x/day with relief. Voice is clearer, stronger today though still hoarse.   He has alternated the gatorade and water via GTube - \"I don't
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
## 2017-08-10 NOTE — RESPIRATORY THERAPY NOTE
RESPIRATORY THERAPY PATIENT TRANSPORT NOTE    Transported from: ER ROOM 32  Transported to: CT SCAN        Patient is intubated?:yes  Transport ventilator used?:no  Resuscitation bag used during transport?:yes   ETT placement and ventilator function were v
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
## 2017-08-10 NOTE — RESPIRATORY THERAPY NOTE
RESPIRATORY THERAPY PATIENT TRANSPORT NOTE    Transported from: CT SCAN  Transported to: ER ROOM 27      Patient is intubated?:yes  Transport ventilator used?:no  Resuscitation bag used during transport?:yes   ETT placement and ventilator function were sheryl

## 2017-08-10 NOTE — ED NOTES
Rehan neff at bedside (evidence tech) taking pictures of pt. Family consented for pictures at bedside.

## 2017-08-10 NOTE — PROGRESS NOTES
Pt here for twice weekly hydration. Reports \"feeling the same as usual\". Unable to eat or  drink. States he flushes G tube with water and takes 3 cans of ensure daily via G tube. Encouraged increase in fluid intake so he isn't dependent on infusions.   D
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
## 2017-10-09 ENCOUNTER — TELEPHONE (OUTPATIENT)
Dept: INTERNAL MEDICINE CLINIC | Facility: CLINIC | Age: 67
End: 2017-10-09

## 2018-04-10 ENCOUNTER — TELEPHONE (OUTPATIENT)
Dept: OTOLARYNGOLOGY | Facility: CLINIC | Age: 68
End: 2018-04-10

## 2018-04-10 NOTE — TELEPHONE ENCOUNTER
Cancer center requesting surgery report from 05/04, advised they will need to contact Medical records.

## 2021-05-11 NOTE — TELEPHONE ENCOUNTER
CARDIAC CLEARANCE REQUEST    What type of procedure are you having:left foot screw removal    Are you taking any blood thinners:yes, how long to hold aspirin and plavix?     When is your procedure scheduled for:5-17-21    What physician is performing your procedure: at [facility] Number:n/a    Fax number to send the letter:n/a    Please call pt with response Pt is eligible for a Medicare Annual Health Assessment. Left msg to call back.

## 2021-07-22 NOTE — PROGRESS NOTES
Group Therapy Documentation    PATIENT'S NAME: Martín Shoemaker  MRN:   [id number]  :   1990  ACCT. NUMBER: [id number]  DATE OF SERVICE: 21  START TIME: 12:30 PM  END TIME:  2:30 PM  FACILITATOR(S): Juan Manuel Foster LADC  TOPIC: BEH Group Therapy  Number of patients attending the group:  4  Group Length:  2 Hours    Group Therapy Type: Recovery strategies    Summary of Group / Topics Discussed:    Recovery Principles      Group Attendance:  Attended group session    Patient's response to the group topic/interactions:  cooperative with task    Patient appeared to be Actively participating.        Client specific details:  Martín participated and interacted appropriately with peers and staff in PM group. No triggers to use noted or discussed.      
Pina Pedersen presents today  for hydration. Orthostatic vital signs completed; refer to vital sign charting. Had difficulty taking any fluid/food at home this past weekend. States not due to nausea but lack of ability to swallow.  Had radiation and then exam wit
Acute congestive heart failure, unspecified heart failure type

## 2022-02-04 NOTE — H&P
[facility]    PATIENT'S NAME: Bonnie Bailey   ATTENDING PHYSICIAN: Darell Dunbar MD   PATIENT ACCOUNT#:   [id number]    LOCATION:  [address] [place] [address] RECORD #:   [id number]       YOB: 1950  ADMISSION DATE:       07/03/2 Oncology LSW attempted to outreach PT to provide support and assess for areas of need  LSW left PT a detailed VM requesting return call at PT's earliest convenience  person, no acute distress. VITAL SIGNS:  Temperature 98.2, pulse 76, respiratory rate 20, blood pressure 156/83, pulse ox 96% on room air. HEENT:  Atraumatic. Oropharynx clear with moist mucous membranes. Normal hard and soft palate.    NECK:  Trachea m

## 2023-05-09 NOTE — MR AVS SNAPSHOT
[place]   2017 1:30 PM   Office Visit   MRN:  [id number]    Description:  Male : 1950   Provider:  Agusto Gao   Department:  [facility] AND [facility] Hematology Oncology              Visit Summary      Allergies     No Known Counseling and Referral of Other Preventative  (Italic type indicates deductible and co-insurance are waived)    Patient Name: Julisa Jensen  Today's Date: 5/9/2023    Health Maintenance       Date Due Completion Date    Hemoglobin A1c (Prediabetes) Never done ---    High Dose Statin Never done ---    COVID-19 Vaccine (3 - Booster for Moderna series) 09/03/2021 7/9/2021    Shingles Vaccine (2 of 2) 06/09/2023 (Originally 1/11/2021) 11/16/2020    DEXA Scan 03/08/2026 3/8/2023    Lipid Panel 09/16/2027 9/16/2022    Colorectal Cancer Screening 06/23/2030 6/23/2020    TETANUS VACCINE 11/16/2030 11/16/2020        No orders of the defined types were placed in this encounter.    The following information is provided to all patients.  This information is to help you find resources for any of the problems found today that may be affecting your health:                Living healthy guide: www.[facility].[place].[facility]      Understanding Diabetes: www.diabetes.org      Eating healthy: www.cdc.gov/healthyweight      [facility] home safety checklist: www.cdc.gov/steadi/patient.html      Agency on Aging: www.goea.[place].gov      Alcoholics anonymous (AA): www.aa.org      Physical Activity: www.elvira.nih.gov/qr7eyur      Tobacco use: www.quitwithusla.org      Take 2 tabs (4mg) at the first sign of diarrhea; then take 1 tab (2mg) every 2 hours until you have had no diarrhea for at least 12 hours; during the night take 2 tabs (4mg) every 4 hours    When to call the doctor:  ? Fever -100.5  ?  Nausea/vomiting not c o If nauseated, try dry foods, such as toast, crackers or pretzels, light or bland foods    Fluid intake:  o Drink 8-10 cups of liquid a day and take a water bottle wherever you go.   Any fluid is acceptable, but caffeinated products do not count towards yo Safety and Handling of Chemotherapy  While you or your family member is receiving chemotherapy, whether in the clinic or at home, the following precautions must be taken to lessen any exposure to the medication. Home Intravenous (IV) Medication:  1.  Susu White 3. Sexual activity is safe while throughout treatment. It is possible that traces of the oral chemotherapy may be present in vaginal fluid or semen for 48 hours after taking. A condom should be used during this time.   Effective birth control should be us If you have questions, you can call [phone] to talk to our [facility] Staff. Remember, Qirehart is NOT to be used for urgent needs. For medical emergencies, dial 911.

## 2023-07-06 NOTE — PROGRESS NOTES
[facility] Radiation Treatment Management Note 26-30    Patient:  Brandy Ziegler  Age:  69year old  Visit Diagnosis:    1.  Malignant neoplasm of base of tongue ([address]      Primary Rad/Onc:  Dr. Beatriz Park    Site Delivered Note:  Subjective:  Weight up, taking everything via g-tube. Pain increased over last week or two, taking Norco 1 pill q6h. Reluctant to take more due to constipation as above. Taking Miralax. Objective:  Grade 1-2 erythema to neck.   Oral cavity wi Hydroxychloroquine Pregnancy And Lactation Text: This medication has been shown to cause fetal harm but it isn't assigned a Pregnancy Risk Category. There are small amounts excreted in breast milk.

## 2025-01-14 NOTE — TELEPHONE ENCOUNTER
Spoke with Radha Darling RN at GI clinic. Referral placed for feeding tube per patient and Radiation Oncology request. Radha Darling will reach out to patient for an appt for consult and subsequent placement date.
[No Acute Changes] : No acute changes since previous visit
[No Acute Changes] : No acute changes since previous visit

## (undated) DEVICE — ENDOSCOPY PACK UPPER: Brand: MEDLINE INDUSTRIES, INC.

## (undated) DEVICE — Device: Brand: DEFENDO AIR/WATER/SUCTION AND BIOPSY VALVE

## (undated) NOTE — MR AVS SNAPSHOT
Nadir Cassidy   2017 9:30 AM   Nurse Only   MRN:  [id number]    Description:  Male : 1950   Department:  [address] [place] [address] - [facility]              Visit Summary      Primary Visit Diagnosis     Oropharyngeal cancer Appointment with Allan Dillon at [facility], [facility], [address], [facility] ([phone])   [address] [place] [address], [address] [place] [address] [phone]       Wednesday June 21, 2017 8:30 AM     Appointment with EM CC LAB2 at [address] [place] [address] Result Summary for CBC W/ DIFFERENTIAL      MyChart     Sign up for Itibia Technologieshart, your secure online medical record. Space Apart will allow you to access patient instructions from your recent visit,  view other health information, and more.  To sign up or find more

## (undated) NOTE — MR AVS SNAPSHOT
[address] [facility]  [phone]               Thank you for choosing us for your health care visit with Santi Oliveira MD.  We are glad to serve you and happy to provide you with this summar Calcium Carbonate 600 MG Tabs           hydrochlorothiazide 12.5 MG Caps   Take 12.5 mg by mouth daily. Commonly known as:  MICROZIDE           ibuprofen 200mg   Commonly known as:  ADVIL           lisinopril 20 MG Tabs   Take 20 mg by mouth daily.    Co

## (undated) NOTE — MR AVS SNAPSHOT
Augustus Chandra   2017 9:30 AM   Office Visit   MRN:  [id number]    Description:  Male : 1950   Provider:  Bj Vaughan   Department:  [facility] AND [facility] Hematology Oncology              Visit Summary      Primary Visit Ahmet Cano medical record, we can assist you to set up an appointment with the appropriate medical professional to review your records.       To Do List     Friday June 23, 2017 7:30 AM     Appointment with MICHAEL BREEN 1 at [address] [place] [address] (3 [address] [place] [phone]       Wednesday September 20, 2017 2:40 PM     Appointment with Darryle Bowers at [facility], [facility], 12 [facility], [facility] [place] [address] ([phone])   [address] [place] [address], 22 [facility]     Sign up for [address], your

## (undated) NOTE — MR AVS SNAPSHOT
Sammy Urbina   2017 8:30 AM   Nurse Only   MRN:  [id number]    Description:  Male : 1950   Department:  [facility] - Infusion              Visit Summary      Primary Visit Diagnosis     Cancer of base of to Appointment with EM KEDAR ONC MONTHLY RESOURCE at [address] [facility] ([phone])   [address] [facility] [address]   [address] [place] [address]       Monday July 03, 2017 8:20 AM     Appointment with Betzaida Cartwright; Lizet Suarez at [facility] CBC W/ DIFFERENTIAL[[id number]]                              In process                   Please view results for these tests on the individual orders.                   Result Summary for CBC W/ DIFFERENTIAL      MyChart     Sign up for MyChart,

## (undated) NOTE — MR AVS SNAPSHOT
Chuy Ferguson   2017 7:30 AM   Office Visit   MRN:  [id number]    Description:  Male : 1950   Department:  [address] [place] [address] - [facility]              Visit Summary      Primary Visit Diagnosis     Dehydration [facility] ([phone])   Y[address] [facility] Ever Perkins Monday [address]       Tuesday June 20, 2017 3:20 PM     Appointment with Terence Jones at [facility], [facility], 12 [facility], [address] [place] [address] [phone])   [address] [place] [address], [address] [place] [address] 90 46 information, go to [email or web address]. org and click on the Sign Up Now link in the [employer] box. Enter your [employer] Activation Code exactly as it appears below along with your Zip Code and Date of Birth to complete the sign-up process.  If you do

## (undated) NOTE — Clinical Note
Mele Sofia Md  [address] [place] [address], [address] [place] [address]       04/17/2017        Patient: Frank Estevez   YOB: 1950   Date of Visit: 4/14/2017       Dear  Dr. Sami Thompson MD,      Thank you for referring Frank Estevez to

## (undated) NOTE — MR AVS SNAPSHOT
Tony Chandra   6/15/2017 8:30 AM   Office Visit   MRN:  [id number]    Description:  Male : 1950   Department:  [address] [place] [address]              Visit Summary      Primary Visit Diagnosis     Dehydration Appointment with MICHEAL ANDERSONN 5 at [address] [place] [address] ([phone])   [address]   [address] [place] [address]       Friday June 23, 2017 7:30 AM     Appointment with MICHAEL ANDERSONN 1 at [address] [place] [address] [phone]

## (undated) NOTE — MR AVS SNAPSHOT
Crista Chandra   2017 7:30 AM   Office Visit   MRN:  [id number]    Description:  Male : 1950   Department:  [address] [place] [address]              Visit Summary      Primary Visit Diagnosis     Dehydration ([phone])   [address] [place] [address]       Wednesday June 21, 2017 9:30 AM     Appointment with Tamara Riley at [facility] AND [facility] Hematology Oncology ([phone])   [phone]. [address]   [facility] [address]       Wednesday June 21, Expires: 8/5/2017  7:27 AM    If you have questions, you can call [phone] to talk to our [facility] Staff. Remember, Badgehart is NOT to be used for urgent needs. For medical emergencies, dial 911.

## (undated) NOTE — Clinical Note
Printed: 2017    Patient Name: Maral Alvarado  : 1950   Medical Record #: [id number]    Consent to Chemotherapy/Biotherapy Cancer Treatment    I, Maral Alvarado, understand that I have been diagnosed with oropharyngeal cancer (st questions have been answered to my satisfaction. I understand that I can contact my oncologist, Marlyn Trejo, or my Cancer Care Team at any time if I have questions, by calling [phone].    Additional written information will be given to me prior to s

## (undated) NOTE — MR AVS SNAPSHOT
Frank Guest   2017 1:30 PM   Office Visit   MRN:  [id number]    Description:  Male : 1950   Provider:  Kristen Crespo   Department:  [address] [place] [address]              Visit Summary      Pr [address] [place] [phone]            [employer]     Sign up for Urban [employer]rics, your secure online medical record. [employer] will allow you to access patient instructions from your recent visit,  view other health information, and more.  To sig

## (undated) NOTE — MR AVS SNAPSHOT
Lydia Byrd   2017 8:20 AM   Office Visit   MRN:  [id number]    Description:  Male : 1950   Provider:  Rasheed Curtis   Department:  [address] [place] [address]              Visit Summary      Pr Appointment with EM CC INFRN 5 at [address] [place] [address] ([phone])   [address]   [address] [place] [address]       Friday June 23, 2017 7:30 AM     Appointment with MICHAEL ANDERSONN 1 at [address] [place] [address] [phone] If you have questions, you can call [phone] to talk to our [facility] Staff. Remember, Beatpackinghart is NOT to be used for urgent needs. For medical emergencies, dial 911.

## (undated) NOTE — MR AVS SNAPSHOT
Melissa Chandra   2017 10:30 AM   Office Visit   MRN:  [id number]    Description:  Male : 1950   Department:  [address] [place] [address] - [facility]              Visit Summary      Primary Visit Diagnosis     Cancer of base of Appointment with MICHAEL CC LAB2 at [address] [place] [address] ([phone])   [address]   [address] [place] [address]       Wednesday July 12, 2017 10:00 AM     Appointment with Fredi Tay at [facility] AND CLINICS Hematology Oncology ([phone]

## (undated) NOTE — MR AVS SNAPSHOT
Vanessa Simmons   2017 8:20 AM   Office Visit   MRN:  [id number]    Description:  Male : 1950   Provider:  Eduard Leroy   Department:  [address] [facility]              Visit Summary      Pr [facility] [phone]       Wednesday June 21, 2017 8:30 AM     Appointment with MICHAEL CARBALLO LAB2 at [address] [place] [address] ([phone])   [address]   [address] [place] [address]       Wednesday June 21, 2017 9:30 AM     Appointment with Adam Wood your Zip Code and Date of Birth to complete the sign-up process. If you do not sign up before the expiration date, you must request a new code.     Your unique LATTO Access Code: [email or web address]  Expires: 8/5/2017  7:27 AM    If you have questions, you can ca

## (undated) NOTE — MR AVS SNAPSHOT
Damien Chandra   2017 10:30 AM   Office Visit   MRN:  [id number]    Description:  Male : 1950   Department:  [address] [place] [address]              Visit Summary      Primary Visit Diagnosis     Oropharyngeal can clean up any of your body fluids after you have treatment. 3. Sexual activity is safe while throughout treatment. It is possible that traces of the oral chemotherapy may be present in vaginal fluid or semen for 48 hours after taking.   A condom should be Tuesday June 20, 2017 3:20 PM     Appointment with Kristi Marte at [facility], [facility], 12 [facility], [address] [place] [address] ([phone])   [address] [place] [address], [address] [place] [address]       Wednesday June 21, 2017 8:30 AM     Appointment with EM CC LAB2 at Avtar Lazo not sign up before the expiration date, you must request a new code. Your unique PSafe Access Code: [email or web address]  Expires: 6/3/2017  8:52 AM    If you have questions, you can call [phone] to talk to our [facility] Staff.  Remember, Akin jay

## (undated) NOTE — MR AVS SNAPSHOT
[address] [facility]  [address] [place] [address], 45 [facility] [place]               Thank you for choosing us for your health care visit with Vincent Hardwick MD.  We are glad to serve you and happy to provide you with this summary UA Microscopic only, urine [E]    Complete by:   Apr 04, 2017    Assoc Dx:  Pure hypercholesterolemia [E78.00]           HEMOGLOBIN A1C    Complete by:  As directed    Assoc Dx:  Pure hypercholesterolemia [E78.00], Elevated blood sugar [R73.9] [facility] Health/Scarlet Bose  Diagnostics [facility] Park[facility]) (Yellow Parking)  [address]   [address] [facility],  Jarrett Jamal Brenner, 100[facility]  [address].  Main clinic staff will provide you with the phone number you should call to schedule your appointment.      If you are confident that your benefit plan will not require a referral or authorization, such as [place], please feel free to schedule your rosalva If you are confident that your benefit plan will not require a referral or authorization, such as [place] Medicaid, please feel free to schedule your appointment immediately.  However, if you are unsure about the requirements for authorization, please wait Sign up for CloudStrategiest, your secure online medical record. ProTenders will allow you to access patient instructions from your recent visit,  view other health information, and more. To sign up or find more information, go to [email or web address]. org and cl increments are effective and add up over the week   2 ½ hours per week – spread out over time Use a moira to keep you motivated   Don’t forget strength training with weights and resistance Set goals and track your progress   You don’t need to join a gym.

## (undated) NOTE — MR AVS SNAPSHOT
Ewa Chandra   2017 7:30 AM   Office Visit   MRN:  [id number]    Description:  Male : 1950   Department:  [address] [place] [address]              Visit Summary      Primary Visit Diagnosis     Dehydration Appointment with Miller Cuello at [facility] Hematology Oncology ([phone])   [phone]. [address].   [address] [place] [address]       Wednesday July 12, 2017 11:30 AM     Appointment with MICHAEL BREEN 2 at [address] [place] [address] ([phone]

## (undated) NOTE — MR AVS SNAPSHOT
[facility]  [address] [place] [address], [address] [place], [place] [address]keren Isidro               Thank you for choosing us for your health care visit with Arun Haro MD.  We are glad to serve you and happy to provide you with this summary fail to improve.          Referral Details     Referred By    Referred To    Antonia Montez MD   [address]   Phone:  [phone]   Fax:  [phone]    Diagnoses:  External nasal lesion   Order:  Derm - Internal    Edwinna Sessions recent med list.                atorvastatin 40 MG Tabs   Take 40 mg by mouth daily. Commonly known as:  LIPITOR           Calcium Carbonate 600 MG Tabs   Take 600 mg by mouth daily. FISH OIL + D3 OR   Take 1 tablet by mouth daily.            hy If you have questions, you can call [phone] to talk to our [facility] Staff. Remember, RED INNOVAhart is NOT to be used for urgent needs. For medical emergencies, dial 911.            Visit [facility] online at  [email or web address]

## (undated) NOTE — MR AVS SNAPSHOT
Ryan Vanegas   2017 7:30 AM   Office Visit   MRN:  [id number]    Description:  Male : 1950   Department:  [facility] - Infusion              Visit Summary      Primary Visit Diagnosis     Dehydration Appointment with Presley Hook at [facility], [facility], 12 [facility], [address] [place] [address] ([phone])   [address] [place] [address], [address] [place] [phone]       Wednesday June 21, 2017 8:30 AM     Appointment with MICHAEL SALAMANCA at [address] [place] [address] 15 Enter your Osmosis Activation Code exactly as it appears below along with your Zip Code and Date of Birth to complete the sign-up process. If you do not sign up before the expiration date, you must request a new code.     Your unique Osmosis Access Code: [email or web address]

## (undated) NOTE — MR AVS SNAPSHOT
Arturo Gale   2017 8:20 AM   Office Visit   MRN:  [id number]    Description:  Male : 1950   Provider:  Benny Nelson Sport   Department:  [address]              Visit Summary      [facility] [address] [facility] [place] [address]       Tuesday June 20, 2017 8:20 AM     Appointment with Avril Pearce; Christy Jeong at [facility] [facility] ([phone])   [address] [facility] [address] [place] [address]       Tuesday June 20, 2 Sign up for Picomizet, your secure online medical record. TransactionTree will allow you to access patient instructions from your recent visit,  view other health information, and more. To sign up or find more information, go to [email or web address]. org and cl

## (undated) NOTE — MR AVS SNAPSHOT
Arturo Gale   2017 2:00 PM   Office Visit   MRN:  [id number]    Description:  Male : 1950   Provider:  Denys Patterson   Department:  [facility] AND [facility] Hematology Oncology              Visit Summary      Allergies     No Known Sign up for StreetÂ LibraryÂ Networkt, your secure online medical record. Spinback will allow you to access patient instructions from your recent visit,  view other health information, and more. To sign up or find more information, go to [email or web address]. org and cl